# Patient Record
Sex: MALE | Race: WHITE | NOT HISPANIC OR LATINO | URBAN - METROPOLITAN AREA
[De-identification: names, ages, dates, MRNs, and addresses within clinical notes are randomized per-mention and may not be internally consistent; named-entity substitution may affect disease eponyms.]

---

## 2020-02-04 ENCOUNTER — INPATIENT (INPATIENT)
Facility: HOSPITAL | Age: 50
LOS: 10 days | Discharge: HOME HEALTH | End: 2020-02-15
Attending: SURGERY | Admitting: SURGERY
Payer: COMMERCIAL

## 2020-02-04 VITALS
WEIGHT: 184.97 LBS | RESPIRATION RATE: 20 BRPM | OXYGEN SATURATION: 96 % | HEART RATE: 104 BPM | TEMPERATURE: 100 F | DIASTOLIC BLOOD PRESSURE: 73 MMHG | SYSTOLIC BLOOD PRESSURE: 122 MMHG | HEIGHT: 68 IN

## 2020-02-04 DIAGNOSIS — K57.80 DIVERTICULITIS OF INTESTINE, PART UNSPECIFIED, WITH PERFORATION AND ABSCESS WITHOUT BLEEDING: ICD-10-CM

## 2020-02-04 LAB
ALBUMIN SERPL ELPH-MCNC: 4.6 G/DL — SIGNIFICANT CHANGE UP (ref 3.5–5.2)
ALP SERPL-CCNC: 67 U/L — SIGNIFICANT CHANGE UP (ref 30–115)
ALT FLD-CCNC: 20 U/L — SIGNIFICANT CHANGE UP (ref 0–41)
ANION GAP SERPL CALC-SCNC: 16 MMOL/L — HIGH (ref 7–14)
ANION GAP SERPL CALC-SCNC: 17 MMOL/L — HIGH (ref 7–14)
APPEARANCE UR: CLEAR — SIGNIFICANT CHANGE UP
APTT BLD: 29.9 SEC — SIGNIFICANT CHANGE UP (ref 27–39.2)
AST SERPL-CCNC: 26 U/L — SIGNIFICANT CHANGE UP (ref 0–41)
BASOPHILS # BLD AUTO: 0.02 K/UL — SIGNIFICANT CHANGE UP (ref 0–0.2)
BASOPHILS NFR BLD AUTO: 0.2 % — SIGNIFICANT CHANGE UP (ref 0–1)
BILIRUB SERPL-MCNC: 0.3 MG/DL — SIGNIFICANT CHANGE UP (ref 0.2–1.2)
BILIRUB UR-MCNC: NEGATIVE — SIGNIFICANT CHANGE UP
BUN SERPL-MCNC: 11 MG/DL — SIGNIFICANT CHANGE UP (ref 10–20)
BUN SERPL-MCNC: 9 MG/DL — LOW (ref 10–20)
CALCIUM SERPL-MCNC: 9.1 MG/DL — SIGNIFICANT CHANGE UP (ref 8.5–10.1)
CALCIUM SERPL-MCNC: 9.2 MG/DL — SIGNIFICANT CHANGE UP (ref 8.5–10.1)
CHLORIDE SERPL-SCNC: 96 MMOL/L — LOW (ref 98–110)
CHLORIDE SERPL-SCNC: 96 MMOL/L — LOW (ref 98–110)
CO2 SERPL-SCNC: 21 MMOL/L — SIGNIFICANT CHANGE UP (ref 17–32)
CO2 SERPL-SCNC: 22 MMOL/L — SIGNIFICANT CHANGE UP (ref 17–32)
COLOR SPEC: YELLOW — SIGNIFICANT CHANGE UP
CREAT SERPL-MCNC: 0.9 MG/DL — SIGNIFICANT CHANGE UP (ref 0.7–1.5)
CREAT SERPL-MCNC: 0.9 MG/DL — SIGNIFICANT CHANGE UP (ref 0.7–1.5)
DIFF PNL FLD: NEGATIVE — SIGNIFICANT CHANGE UP
EOSINOPHIL # BLD AUTO: 0 K/UL — SIGNIFICANT CHANGE UP (ref 0–0.7)
EOSINOPHIL NFR BLD AUTO: 0 % — SIGNIFICANT CHANGE UP (ref 0–8)
GLUCOSE SERPL-MCNC: 133 MG/DL — HIGH (ref 70–99)
GLUCOSE SERPL-MCNC: 99 MG/DL — SIGNIFICANT CHANGE UP (ref 70–99)
GLUCOSE UR QL: NEGATIVE MG/DL — SIGNIFICANT CHANGE UP
HCT VFR BLD CALC: 41.2 % — LOW (ref 42–52)
HCT VFR BLD CALC: 43.7 % — SIGNIFICANT CHANGE UP (ref 42–52)
HGB BLD-MCNC: 12.9 G/DL — LOW (ref 14–18)
HGB BLD-MCNC: 13.9 G/DL — LOW (ref 14–18)
IMM GRANULOCYTES NFR BLD AUTO: 0.6 % — HIGH (ref 0.1–0.3)
INR BLD: 1.29 RATIO — SIGNIFICANT CHANGE UP (ref 0.65–1.3)
KETONES UR-MCNC: NEGATIVE — SIGNIFICANT CHANGE UP
LACTATE SERPL-SCNC: 1.5 MMOL/L — SIGNIFICANT CHANGE UP (ref 0.7–2)
LEUKOCYTE ESTERASE UR-ACNC: NEGATIVE — SIGNIFICANT CHANGE UP
LYMPHOCYTES # BLD AUTO: 1.21 K/UL — SIGNIFICANT CHANGE UP (ref 1.2–3.4)
LYMPHOCYTES # BLD AUTO: 11.7 % — LOW (ref 20.5–51.1)
MAGNESIUM SERPL-MCNC: 1.9 MG/DL — SIGNIFICANT CHANGE UP (ref 1.8–2.4)
MANUAL SMEAR VERIFICATION: SIGNIFICANT CHANGE UP
MCHC RBC-ENTMCNC: 23.5 PG — LOW (ref 27–31)
MCHC RBC-ENTMCNC: 23.6 PG — LOW (ref 27–31)
MCHC RBC-ENTMCNC: 31.3 G/DL — LOW (ref 32–37)
MCHC RBC-ENTMCNC: 31.8 G/DL — LOW (ref 32–37)
MCV RBC AUTO: 74.3 FL — LOW (ref 80–94)
MCV RBC AUTO: 75.2 FL — LOW (ref 80–94)
MICROCYTES BLD QL: SLIGHT — SIGNIFICANT CHANGE UP
MONOCYTES # BLD AUTO: 0.64 K/UL — HIGH (ref 0.1–0.6)
MONOCYTES NFR BLD AUTO: 6.2 % — SIGNIFICANT CHANGE UP (ref 1.7–9.3)
NEUTROPHILS # BLD AUTO: 8.39 K/UL — HIGH (ref 1.4–6.5)
NEUTROPHILS NFR BLD AUTO: 81.3 % — HIGH (ref 42.2–75.2)
NITRITE UR-MCNC: NEGATIVE — SIGNIFICANT CHANGE UP
NRBC # BLD: 0 /100 WBCS — SIGNIFICANT CHANGE UP (ref 0–0)
NRBC # BLD: 0 /100 WBCS — SIGNIFICANT CHANGE UP (ref 0–0)
PH UR: 6 — SIGNIFICANT CHANGE UP (ref 5–8)
PLAT MORPH BLD: NORMAL — SIGNIFICANT CHANGE UP
PLATELET # BLD AUTO: 181 K/UL — SIGNIFICANT CHANGE UP (ref 130–400)
PLATELET # BLD AUTO: 185 K/UL — SIGNIFICANT CHANGE UP (ref 130–400)
POTASSIUM SERPL-MCNC: 4.5 MMOL/L — SIGNIFICANT CHANGE UP (ref 3.5–5)
POTASSIUM SERPL-MCNC: 4.8 MMOL/L — SIGNIFICANT CHANGE UP (ref 3.5–5)
POTASSIUM SERPL-SCNC: 4.5 MMOL/L — SIGNIFICANT CHANGE UP (ref 3.5–5)
POTASSIUM SERPL-SCNC: 4.8 MMOL/L — SIGNIFICANT CHANGE UP (ref 3.5–5)
PROT SERPL-MCNC: 7.7 G/DL — SIGNIFICANT CHANGE UP (ref 6–8)
PROT UR-MCNC: NEGATIVE MG/DL — SIGNIFICANT CHANGE UP
PROTHROM AB SERPL-ACNC: 14.8 SEC — HIGH (ref 9.95–12.87)
RBC # BLD: 5.48 M/UL — SIGNIFICANT CHANGE UP (ref 4.7–6.1)
RBC # BLD: 5.88 M/UL — SIGNIFICANT CHANGE UP (ref 4.7–6.1)
RBC # FLD: 14.2 % — SIGNIFICANT CHANGE UP (ref 11.5–14.5)
RBC # FLD: 14.4 % — SIGNIFICANT CHANGE UP (ref 11.5–14.5)
RBC BLD AUTO: SIGNIFICANT CHANGE UP
SODIUM SERPL-SCNC: 133 MMOL/L — LOW (ref 135–146)
SODIUM SERPL-SCNC: 135 MMOL/L — SIGNIFICANT CHANGE UP (ref 135–146)
SP GR SPEC: 1.01 — SIGNIFICANT CHANGE UP (ref 1.01–1.03)
UROBILINOGEN FLD QL: 0.2 MG/DL — SIGNIFICANT CHANGE UP (ref 0.2–0.2)
WBC # BLD: 10.32 K/UL — SIGNIFICANT CHANGE UP (ref 4.8–10.8)
WBC # BLD: 12.09 K/UL — HIGH (ref 4.8–10.8)
WBC # FLD AUTO: 10.32 K/UL — SIGNIFICANT CHANGE UP (ref 4.8–10.8)
WBC # FLD AUTO: 12.09 K/UL — HIGH (ref 4.8–10.8)

## 2020-02-04 PROCEDURE — 71045 X-RAY EXAM CHEST 1 VIEW: CPT | Mod: 26

## 2020-02-04 PROCEDURE — 99285 EMERGENCY DEPT VISIT HI MDM: CPT

## 2020-02-04 PROCEDURE — 99222 1ST HOSP IP/OBS MODERATE 55: CPT | Mod: 57

## 2020-02-04 PROCEDURE — 74177 CT ABD & PELVIS W/CONTRAST: CPT | Mod: 26

## 2020-02-04 RX ORDER — HEPARIN SODIUM 5000 [USP'U]/ML
5000 INJECTION INTRAVENOUS; SUBCUTANEOUS EVERY 8 HOURS
Refills: 0 | Status: DISCONTINUED | OUTPATIENT
Start: 2020-02-04 | End: 2020-02-05

## 2020-02-04 RX ORDER — ACETAMINOPHEN 500 MG
650 TABLET ORAL ONCE
Refills: 0 | Status: COMPLETED | OUTPATIENT
Start: 2020-02-04 | End: 2020-02-04

## 2020-02-04 RX ORDER — TEMAZEPAM 15 MG/1
15 CAPSULE ORAL AT BEDTIME
Refills: 0 | Status: DISCONTINUED | OUTPATIENT
Start: 2020-02-04 | End: 2020-02-05

## 2020-02-04 RX ORDER — HYDROMORPHONE HYDROCHLORIDE 2 MG/ML
0.5 INJECTION INTRAMUSCULAR; INTRAVENOUS; SUBCUTANEOUS
Refills: 0 | Status: DISCONTINUED | OUTPATIENT
Start: 2020-02-04 | End: 2020-02-05

## 2020-02-04 RX ORDER — OXYCODONE HYDROCHLORIDE 5 MG/1
5 TABLET ORAL EVERY 6 HOURS
Refills: 0 | Status: DISCONTINUED | OUTPATIENT
Start: 2020-02-04 | End: 2020-02-04

## 2020-02-04 RX ORDER — METRONIDAZOLE 500 MG
500 TABLET ORAL EVERY 8 HOURS
Refills: 0 | Status: DISCONTINUED | OUTPATIENT
Start: 2020-02-04 | End: 2020-02-05

## 2020-02-04 RX ORDER — CIPROFLOXACIN LACTATE 400MG/40ML
400 VIAL (ML) INTRAVENOUS EVERY 12 HOURS
Refills: 0 | Status: DISCONTINUED | OUTPATIENT
Start: 2020-02-04 | End: 2020-02-05

## 2020-02-04 RX ORDER — CIPROFLOXACIN LACTATE 400MG/40ML
400 VIAL (ML) INTRAVENOUS ONCE
Refills: 0 | Status: COMPLETED | OUTPATIENT
Start: 2020-02-04 | End: 2020-02-04

## 2020-02-04 RX ORDER — SODIUM CHLORIDE 9 MG/ML
1000 INJECTION, SOLUTION INTRAVENOUS
Refills: 0 | Status: DISCONTINUED | OUTPATIENT
Start: 2020-02-04 | End: 2020-02-04

## 2020-02-04 RX ORDER — ACETAMINOPHEN 500 MG
650 TABLET ORAL EVERY 6 HOURS
Refills: 0 | Status: DISCONTINUED | OUTPATIENT
Start: 2020-02-04 | End: 2020-02-04

## 2020-02-04 RX ORDER — MORPHINE SULFATE 50 MG/1
4 CAPSULE, EXTENDED RELEASE ORAL
Refills: 0 | Status: DISCONTINUED | OUTPATIENT
Start: 2020-02-04 | End: 2020-02-04

## 2020-02-04 RX ORDER — ACETAMINOPHEN 500 MG
975 TABLET ORAL ONCE
Refills: 0 | Status: COMPLETED | OUTPATIENT
Start: 2020-02-04 | End: 2020-02-04

## 2020-02-04 RX ORDER — SODIUM CHLORIDE 9 MG/ML
1000 INJECTION, SOLUTION INTRAVENOUS
Refills: 0 | Status: DISCONTINUED | OUTPATIENT
Start: 2020-02-04 | End: 2020-02-05

## 2020-02-04 RX ORDER — ACETAMINOPHEN 500 MG
650 TABLET ORAL EVERY 6 HOURS
Refills: 0 | Status: DISCONTINUED | OUTPATIENT
Start: 2020-02-04 | End: 2020-02-05

## 2020-02-04 RX ORDER — IBUPROFEN 200 MG
600 TABLET ORAL EVERY 6 HOURS
Refills: 0 | Status: DISCONTINUED | OUTPATIENT
Start: 2020-02-04 | End: 2020-02-04

## 2020-02-04 RX ORDER — SODIUM CHLORIDE 9 MG/ML
2600 INJECTION, SOLUTION INTRAVENOUS ONCE
Refills: 0 | Status: COMPLETED | OUTPATIENT
Start: 2020-02-04 | End: 2020-02-04

## 2020-02-04 RX ORDER — METRONIDAZOLE 500 MG
500 TABLET ORAL ONCE
Refills: 0 | Status: COMPLETED | OUTPATIENT
Start: 2020-02-04 | End: 2020-02-04

## 2020-02-04 RX ORDER — PANTOPRAZOLE SODIUM 20 MG/1
40 TABLET, DELAYED RELEASE ORAL DAILY
Refills: 0 | Status: DISCONTINUED | OUTPATIENT
Start: 2020-02-04 | End: 2020-02-05

## 2020-02-04 RX ORDER — ONDANSETRON 8 MG/1
4 TABLET, FILM COATED ORAL EVERY 8 HOURS
Refills: 0 | Status: DISCONTINUED | OUTPATIENT
Start: 2020-02-04 | End: 2020-02-05

## 2020-02-04 RX ORDER — HYDROMORPHONE HYDROCHLORIDE 2 MG/ML
1 INJECTION INTRAMUSCULAR; INTRAVENOUS; SUBCUTANEOUS ONCE
Refills: 0 | Status: DISCONTINUED | OUTPATIENT
Start: 2020-02-04 | End: 2020-02-04

## 2020-02-04 RX ADMIN — MORPHINE SULFATE 4 MILLIGRAM(S): 50 CAPSULE, EXTENDED RELEASE ORAL at 11:18

## 2020-02-04 RX ADMIN — HYDROMORPHONE HYDROCHLORIDE 0.5 MILLIGRAM(S): 2 INJECTION INTRAMUSCULAR; INTRAVENOUS; SUBCUTANEOUS at 23:23

## 2020-02-04 RX ADMIN — Medication 100 MILLIGRAM(S): at 14:50

## 2020-02-04 RX ADMIN — HYDROMORPHONE HYDROCHLORIDE 0.5 MILLIGRAM(S): 2 INJECTION INTRAMUSCULAR; INTRAVENOUS; SUBCUTANEOUS at 19:56

## 2020-02-04 RX ADMIN — SODIUM CHLORIDE 2600 MILLILITER(S): 9 INJECTION, SOLUTION INTRAVENOUS at 06:59

## 2020-02-04 RX ADMIN — Medication 650 MILLIGRAM(S): at 16:00

## 2020-02-04 RX ADMIN — HYDROMORPHONE HYDROCHLORIDE 0.5 MILLIGRAM(S): 2 INJECTION INTRAMUSCULAR; INTRAVENOUS; SUBCUTANEOUS at 16:54

## 2020-02-04 RX ADMIN — HYDROMORPHONE HYDROCHLORIDE 0.5 MILLIGRAM(S): 2 INJECTION INTRAMUSCULAR; INTRAVENOUS; SUBCUTANEOUS at 17:10

## 2020-02-04 RX ADMIN — ONDANSETRON 4 MILLIGRAM(S): 8 TABLET, FILM COATED ORAL at 19:50

## 2020-02-04 RX ADMIN — OXYCODONE HYDROCHLORIDE 5 MILLIGRAM(S): 5 TABLET ORAL at 13:38

## 2020-02-04 RX ADMIN — SODIUM CHLORIDE 100 MILLILITER(S): 9 INJECTION, SOLUTION INTRAVENOUS at 12:36

## 2020-02-04 RX ADMIN — OXYCODONE HYDROCHLORIDE 5 MILLIGRAM(S): 5 TABLET ORAL at 14:15

## 2020-02-04 RX ADMIN — Medication 975 MILLIGRAM(S): at 06:59

## 2020-02-04 RX ADMIN — Medication 200 MILLIGRAM(S): at 18:21

## 2020-02-04 RX ADMIN — MORPHINE SULFATE 4 MILLIGRAM(S): 50 CAPSULE, EXTENDED RELEASE ORAL at 11:02

## 2020-02-04 RX ADMIN — Medication 100 MILLIGRAM(S): at 06:59

## 2020-02-04 RX ADMIN — HEPARIN SODIUM 5000 UNIT(S): 5000 INJECTION INTRAVENOUS; SUBCUTANEOUS at 14:52

## 2020-02-04 RX ADMIN — Medication 650 MILLIGRAM(S): at 15:17

## 2020-02-04 RX ADMIN — Medication 100 MILLIGRAM(S): at 22:18

## 2020-02-04 RX ADMIN — Medication 200 MILLIGRAM(S): at 07:47

## 2020-02-04 NOTE — H&P ADULT - NSHPPHYSICALEXAM_GEN_ALL_CORE
Gen NAD, A&Ox3  CV +S1 and S2, RR  Resp +air entry B/L  Abd decreased BS, soft, +mild distention, +generalized tenderness (>LLQ/Suprapubic), +voluntary guarding, no rigidity  Ext no edema, no CT        Vital Signs Last 24 Hrs  T(C): 39.7 (04 Feb 2020 06:34), Max: 39.7 (04 Feb 2020 06:34)  T(F): 103.5 (04 Feb 2020 06:34), Max: 103.5 (04 Feb 2020 06:34)  HR: 106 (04 Feb 2020 06:55) (104 - 106)  BP: 128/78 (04 Feb 2020 06:55) (122/73 - 128/78)  BP(mean): --  RR: 20 (04 Feb 2020 06:55) (20 - 20)  SpO2: 97% (04 Feb 2020 06:55) (96% - 97%)

## 2020-02-04 NOTE — H&P ADULT - HISTORY OF PRESENT ILLNESS
Pt is a 50 y/o male who presented to ER with generalized abdominal pain, >lower abdomen, since last night.   Pt reports feeling fatigued and low grade temp 100.3, 2 days ago. He denies abdominal pain, N/V/D at that time. He was seen at  and discharged home on a Z pack and Motrin PRN, for suspected bronchitis. Pt reported anorexia yesterday and developed acute onset of abdominal pain after dinner, which progressed in severity overnight, which prompted visit to ER. Pt w temp 103.5 in ED, HR-106, BP stable.   Pt found to have acute diverticulitis w microperforation, some free air in upper abd/pelvis and questionable 2.5cm abscess between bladder and sigmoid colon.    Pt given IVF and Cipro/Flagyl in ER.

## 2020-02-04 NOTE — ED PROVIDER NOTE - CLINICAL SUMMARY MEDICAL DECISION MAKING FREE TEXT BOX
49 male here for fever and abdominal pain. Had screening labs imaging medications and reevaluation, plan is for inpatient admission for continued management. Got broad spectrum ABX, IVF, supportive care.

## 2020-02-04 NOTE — ED PROVIDER NOTE - OBJECTIVE STATEMENT
49 year old male comes to emergency room for abdominal and fever. as per patient he wasn't feeling well over the weekend was seen in out patient urgent care center and diagnosis with bronchitis and started on zpak. patient states that tonight he has been nausea with increasing abd pain to the point where patient states its hard to walk due to pain. patient admits to dysuria. patient denies vomiting and diarrhea. but states has had fever and chills all night.

## 2020-02-04 NOTE — ED PROVIDER NOTE - PHYSICAL EXAMINATION
Physical Exam    Vital Signs: I have reviewed the initial vital signs.  Constitutional: well-nourished, appears stated age, no acute distress  Eyes: Conjunctiva pink, Sclera clear, PERRLA, EOMI.  Cardiovascular: S1 and S2, tachy regular rate, regular rhythm, well-perfused extremities, radial pulses equal and 2+  Respiratory: unlabored respiratory effort, clear to auscultation bilaterally no wheezing, rales and rhonchi  Gastrointestinal: soft, +RLQ and LLQ tenderness and guarding present, no pulsatile mass, normal bowl sounds.  : Testicles non-tender  Musculoskeletal: supple neck, no lower extremity edema, no midline tenderness  Integumentary: warm, dry, no rash  Neurologic: awake, alert, cranial nerves II-XII grossly intact, extremities’ motor and sensory functions grossly intact  Psychiatric: appropriate mood, appropriate affect

## 2020-02-04 NOTE — H&P ADULT - ATTENDING COMMENTS
Patient seen and examined in the ED with the surgical PA, on 9:30 hours. History and physical as noted. This is his second episode of diverticulitis but he does not recall whether or not he ever had a colonoscopy. Reviewed the blood work and CT scan with official report.  The patient is currently alert and stable, but with a 103 temp on admission. He feels slightly better since admission, his headache has resolved, he received his IV antibiotics 2 to 3 hours ago.  The patient is ill-appearing and complains of constant abdominal pain. No nausea or vomiting currently. He is voiding clear urine without difficulty. Lung fields are clear, heart sounds are regular in rate and rhythm. The abdomen is somewhat distended and quiet. He has significant tenderness along the lateral abdomen with rebound and some voluntary guarding but the remainder of the abdomen is benign and there is no CVA tenderness bilaterally. No masses, hernias, organomegaly.    Impression: Acute sigmoid diverticulitis with perforation, no clear-cut abscess cavity, free intraperitoneal fluid, or obstruction are noted. There is an associated ileus. The patient is to be admitted to the surgical service for hydration, IV antibiotics, and close observation.  He understands that if he does not improve he may require an exploratory laparotomy and colon resection with possible colostomy. The details of the above were discussed in full and all his questions were answered. He understands and agrees.

## 2020-02-04 NOTE — H&P ADULT - NSHPLABSRESULTS_GEN_ALL_CORE
13.9   10.32 )-----------( 181      ( 04 Feb 2020 06:46 )             43.7       02-04    133<L>  |  96<L>  |  11  ----------------------------<  133<H>  4.8   |  21  |  0.9    Ca    9.2      04 Feb 2020 06:46    TPro  7.7  /  Alb  4.6  /  TBili  0.3  /  DBili  x   /  AST  26  /  ALT  20  /  AlkPhos  67  02-04          PT/INR - ( 04 Feb 2020 06:46 )   PT: 14.80 sec;   INR: 1.29 ratio         PTT - ( 04 Feb 2020 06:46 )  PTT:29.9 sec        < from: CT Abdomen and Pelvis w/ IV Cont (02.04.20 @ 07:04) >    There is a moderate amount of free air within the abdomen pelvis. There is focal significant sigmoid wall thickening with diverticula and significant pericolonic inflammatory changes and foci of free air consistent with acute perforated sigmoid diverticulitis. There are mildly prominent adjacent lymph nodes, likely reactive in nature. There is small bowel dilatation, likely representing an ileus related to the diverticulitis. Findings are less likely related to small bowel obstruction, but follow-up is recommended. The appendix is distended, nonspecific. There is a questionable collection between the bladder and sigmoid colon measuring 2.5 x 1.0 cm on image 79 of series 2      < end of copied text >      Lactate Trend  02-04 @ 06:46 Lactate:1.5

## 2020-02-04 NOTE — ED PROVIDER NOTE - PROGRESS NOTE DETAILS
Pt resting comfortable, examined by me Will give more pain medication, surgery spoken to, CT concerning for perforated Diverticulum

## 2020-02-04 NOTE — H&P ADULT - ASSESSMENT
Pt is a 50 y/o male with Perforated Acute Diverticulitis    NPO/IVF  IV abx-Cipro/Flagyl  Serial Abdominal Exams  CBC/BMP at 1500 and in am  Pain Management  Tylenol PRN temp  DVT/GI Prophylaxis

## 2020-02-04 NOTE — ED PROVIDER NOTE - NS ED ROS FT
Constitutional: (+) fever  Eyes/ENT: (-) blurry vision, (-) epistaxis  Cardiovascular: (-) chest pain, (-) syncope  Respiratory: (+) cough, (-) shortness of breath  Gastrointestinal: (-) vomiting, (-) diarrhea  Musculoskeletal: (-) neck pain, (+) back pain, (-) joint pain  Integumentary: (-) rash, (-) edema  Neurological: (-) headache, (-) altered mental status  Psychiatric: (-) hallucinations  Allergic/Immunologic: (-) pruritus

## 2020-02-04 NOTE — ED PROVIDER NOTE - ATTENDING CONTRIBUTION TO CARE
I personally evaluated the patient. I reviewed the Resident’s or Physician Assistant’s note (as assigned above), and agree with the findings and plan except as documented in my note.  49yM no pmhx  pw  2 days of URI,  last night with  generalized  abdominal pain constant, aw dysuria.  fever today  102 , no nv, nonradiating no back pain, no syncope no abdominal surgeries.  Alert painful distress perrl eomi, no pallor  no jaundice  CVS RRR, Resp CTA no tachypnea no hyperpnea, Abd with  guarding  nondistended , No cva tenderness,      given  exam  pt  sent immediately to CT   (  xray at bedside with IV  begin  placed-  xray done  no air under diaphragm )

## 2020-02-05 ENCOUNTER — RESULT REVIEW (OUTPATIENT)
Age: 50
End: 2020-02-05

## 2020-02-05 LAB
ABO RH CONFIRMATION: SIGNIFICANT CHANGE UP
ANION GAP SERPL CALC-SCNC: 12 MMOL/L — SIGNIFICANT CHANGE UP (ref 7–14)
ANION GAP SERPL CALC-SCNC: 19 MMOL/L — HIGH (ref 7–14)
BASOPHILS # BLD AUTO: 0.02 K/UL — SIGNIFICANT CHANGE UP (ref 0–0.2)
BASOPHILS NFR BLD AUTO: 0.1 % — SIGNIFICANT CHANGE UP (ref 0–1)
BLD GP AB SCN SERPL QL: SIGNIFICANT CHANGE UP
BUN SERPL-MCNC: 13 MG/DL — SIGNIFICANT CHANGE UP (ref 10–20)
BUN SERPL-MCNC: 14 MG/DL — SIGNIFICANT CHANGE UP (ref 10–20)
CALCIUM SERPL-MCNC: 7.8 MG/DL — LOW (ref 8.5–10.1)
CALCIUM SERPL-MCNC: 9 MG/DL — SIGNIFICANT CHANGE UP (ref 8.5–10.1)
CHLORIDE SERPL-SCNC: 95 MMOL/L — LOW (ref 98–110)
CHLORIDE SERPL-SCNC: 99 MMOL/L — SIGNIFICANT CHANGE UP (ref 98–110)
CO2 SERPL-SCNC: 22 MMOL/L — SIGNIFICANT CHANGE UP (ref 17–32)
CO2 SERPL-SCNC: 23 MMOL/L — SIGNIFICANT CHANGE UP (ref 17–32)
CREAT SERPL-MCNC: 0.7 MG/DL — SIGNIFICANT CHANGE UP (ref 0.7–1.5)
CREAT SERPL-MCNC: 0.8 MG/DL — SIGNIFICANT CHANGE UP (ref 0.7–1.5)
EOSINOPHIL # BLD AUTO: 0 K/UL — SIGNIFICANT CHANGE UP (ref 0–0.7)
EOSINOPHIL NFR BLD AUTO: 0 % — SIGNIFICANT CHANGE UP (ref 0–8)
GLUCOSE BLDC GLUCOMTR-MCNC: 111 MG/DL — HIGH (ref 70–99)
GLUCOSE SERPL-MCNC: 112 MG/DL — HIGH (ref 70–99)
GLUCOSE SERPL-MCNC: 163 MG/DL — HIGH (ref 70–99)
HCT VFR BLD CALC: 39.9 % — LOW (ref 42–52)
HCT VFR BLD CALC: 41.1 % — LOW (ref 42–52)
HGB BLD-MCNC: 12.8 G/DL — LOW (ref 14–18)
HGB BLD-MCNC: 13.1 G/DL — LOW (ref 14–18)
IMM GRANULOCYTES NFR BLD AUTO: 0.4 % — HIGH (ref 0.1–0.3)
LACTATE SERPL-SCNC: 1.3 MMOL/L — SIGNIFICANT CHANGE UP (ref 0.7–2)
LYMPHOCYTES # BLD AUTO: 1 K/UL — LOW (ref 1.2–3.4)
LYMPHOCYTES # BLD AUTO: 7.1 % — LOW (ref 20.5–51.1)
MAGNESIUM SERPL-MCNC: 1.5 MG/DL — LOW (ref 1.8–2.4)
MCHC RBC-ENTMCNC: 23.7 PG — LOW (ref 27–31)
MCHC RBC-ENTMCNC: 23.7 PG — LOW (ref 27–31)
MCHC RBC-ENTMCNC: 31.9 G/DL — LOW (ref 32–37)
MCHC RBC-ENTMCNC: 32.1 G/DL — SIGNIFICANT CHANGE UP (ref 32–37)
MCV RBC AUTO: 73.9 FL — LOW (ref 80–94)
MCV RBC AUTO: 74.3 FL — LOW (ref 80–94)
MONOCYTES # BLD AUTO: 1 K/UL — HIGH (ref 0.1–0.6)
MONOCYTES NFR BLD AUTO: 7.1 % — SIGNIFICANT CHANGE UP (ref 1.7–9.3)
NEUTROPHILS # BLD AUTO: 12.09 K/UL — HIGH (ref 1.4–6.5)
NEUTROPHILS NFR BLD AUTO: 85.3 % — HIGH (ref 42.2–75.2)
NRBC # BLD: 0 /100 WBCS — SIGNIFICANT CHANGE UP (ref 0–0)
NRBC # BLD: 0 /100 WBCS — SIGNIFICANT CHANGE UP (ref 0–0)
PHOSPHATE SERPL-MCNC: 2.9 MG/DL — SIGNIFICANT CHANGE UP (ref 2.1–4.9)
PLATELET # BLD AUTO: 179 K/UL — SIGNIFICANT CHANGE UP (ref 130–400)
PLATELET # BLD AUTO: 213 K/UL — SIGNIFICANT CHANGE UP (ref 130–400)
POTASSIUM SERPL-MCNC: 4.4 MMOL/L — SIGNIFICANT CHANGE UP (ref 3.5–5)
POTASSIUM SERPL-MCNC: 4.4 MMOL/L — SIGNIFICANT CHANGE UP (ref 3.5–5)
POTASSIUM SERPL-SCNC: 4.4 MMOL/L — SIGNIFICANT CHANGE UP (ref 3.5–5)
POTASSIUM SERPL-SCNC: 4.4 MMOL/L — SIGNIFICANT CHANGE UP (ref 3.5–5)
RBC # BLD: 5.4 M/UL — SIGNIFICANT CHANGE UP (ref 4.7–6.1)
RBC # BLD: 5.53 M/UL — SIGNIFICANT CHANGE UP (ref 4.7–6.1)
RBC # FLD: 14.5 % — SIGNIFICANT CHANGE UP (ref 11.5–14.5)
RBC # FLD: 14.5 % — SIGNIFICANT CHANGE UP (ref 11.5–14.5)
SODIUM SERPL-SCNC: 134 MMOL/L — LOW (ref 135–146)
SODIUM SERPL-SCNC: 136 MMOL/L — SIGNIFICANT CHANGE UP (ref 135–146)
WBC # BLD: 11.69 K/UL — HIGH (ref 4.8–10.8)
WBC # BLD: 14.17 K/UL — HIGH (ref 4.8–10.8)
WBC # FLD AUTO: 11.69 K/UL — HIGH (ref 4.8–10.8)
WBC # FLD AUTO: 14.17 K/UL — HIGH (ref 4.8–10.8)

## 2020-02-05 PROCEDURE — 44602 SUTURE SMALL INTESTINE: CPT | Mod: 59

## 2020-02-05 PROCEDURE — 44143 PARTIAL REMOVAL OF COLON: CPT

## 2020-02-05 PROCEDURE — 88307 TISSUE EXAM BY PATHOLOGIST: CPT | Mod: 26

## 2020-02-05 PROCEDURE — 74018 RADEX ABDOMEN 1 VIEW: CPT | Mod: 26

## 2020-02-05 PROCEDURE — 99233 SBSQ HOSP IP/OBS HIGH 50: CPT | Mod: 57

## 2020-02-05 PROCEDURE — 71045 X-RAY EXAM CHEST 1 VIEW: CPT | Mod: 26

## 2020-02-05 RX ORDER — OXYCODONE HYDROCHLORIDE 5 MG/1
5 TABLET ORAL EVERY 6 HOURS
Refills: 0 | Status: DISCONTINUED | OUTPATIENT
Start: 2020-02-05 | End: 2020-02-05

## 2020-02-05 RX ORDER — HYDROMORPHONE HYDROCHLORIDE 2 MG/ML
0.5 INJECTION INTRAMUSCULAR; INTRAVENOUS; SUBCUTANEOUS
Refills: 0 | Status: DISCONTINUED | OUTPATIENT
Start: 2020-02-05 | End: 2020-02-06

## 2020-02-05 RX ORDER — OXYCODONE HYDROCHLORIDE 5 MG/1
5 TABLET ORAL EVERY 6 HOURS
Refills: 0 | Status: DISCONTINUED | OUTPATIENT
Start: 2020-02-05 | End: 2020-02-06

## 2020-02-05 RX ORDER — HEPARIN SODIUM 5000 [USP'U]/ML
5000 INJECTION INTRAVENOUS; SUBCUTANEOUS EVERY 8 HOURS
Refills: 0 | Status: DISCONTINUED | OUTPATIENT
Start: 2020-02-05 | End: 2020-02-07

## 2020-02-05 RX ORDER — ACETAMINOPHEN 500 MG
650 TABLET ORAL EVERY 6 HOURS
Refills: 0 | Status: DISCONTINUED | OUTPATIENT
Start: 2020-02-05 | End: 2020-02-15

## 2020-02-05 RX ORDER — SODIUM CHLORIDE 9 MG/ML
1000 INJECTION, SOLUTION INTRAVENOUS
Refills: 0 | Status: DISCONTINUED | OUTPATIENT
Start: 2020-02-05 | End: 2020-02-05

## 2020-02-05 RX ORDER — SODIUM CHLORIDE 9 MG/ML
1000 INJECTION, SOLUTION INTRAVENOUS
Refills: 0 | Status: DISCONTINUED | OUTPATIENT
Start: 2020-02-05 | End: 2020-02-06

## 2020-02-05 RX ORDER — OXYCODONE HYDROCHLORIDE 5 MG/1
10 TABLET ORAL EVERY 6 HOURS
Refills: 0 | Status: DISCONTINUED | OUTPATIENT
Start: 2020-02-05 | End: 2020-02-05

## 2020-02-05 RX ORDER — PANTOPRAZOLE SODIUM 20 MG/1
40 TABLET, DELAYED RELEASE ORAL DAILY
Refills: 0 | Status: DISCONTINUED | OUTPATIENT
Start: 2020-02-05 | End: 2020-02-15

## 2020-02-05 RX ORDER — MORPHINE SULFATE 50 MG/1
4 CAPSULE, EXTENDED RELEASE ORAL
Refills: 0 | Status: DISCONTINUED | OUTPATIENT
Start: 2020-02-05 | End: 2020-02-06

## 2020-02-05 RX ORDER — ONDANSETRON 8 MG/1
4 TABLET, FILM COATED ORAL ONCE
Refills: 0 | Status: COMPLETED | OUTPATIENT
Start: 2020-02-05 | End: 2020-02-05

## 2020-02-05 RX ORDER — IBUPROFEN 200 MG
600 TABLET ORAL EVERY 6 HOURS
Refills: 0 | Status: DISCONTINUED | OUTPATIENT
Start: 2020-02-05 | End: 2020-02-05

## 2020-02-05 RX ORDER — MORPHINE SULFATE 50 MG/1
30 CAPSULE, EXTENDED RELEASE ORAL
Refills: 0 | Status: DISCONTINUED | OUTPATIENT
Start: 2020-02-05 | End: 2020-02-05

## 2020-02-05 RX ORDER — ACETAMINOPHEN 500 MG
650 TABLET ORAL EVERY 6 HOURS
Refills: 0 | Status: DISCONTINUED | OUTPATIENT
Start: 2020-02-05 | End: 2020-02-05

## 2020-02-05 RX ORDER — MORPHINE SULFATE 50 MG/1
30 CAPSULE, EXTENDED RELEASE ORAL
Refills: 0 | Status: DISCONTINUED | OUTPATIENT
Start: 2020-02-05 | End: 2020-02-08

## 2020-02-05 RX ORDER — SODIUM CHLORIDE 9 MG/ML
1000 INJECTION, SOLUTION INTRAVENOUS
Refills: 0 | Status: DISCONTINUED | OUTPATIENT
Start: 2020-02-05 | End: 2020-02-07

## 2020-02-05 RX ORDER — ACETAMINOPHEN 500 MG
650 TABLET ORAL ONCE
Refills: 0 | Status: DISCONTINUED | OUTPATIENT
Start: 2020-02-05 | End: 2020-02-06

## 2020-02-05 RX ORDER — METRONIDAZOLE 500 MG
500 TABLET ORAL EVERY 8 HOURS
Refills: 0 | Status: DISCONTINUED | OUTPATIENT
Start: 2020-02-05 | End: 2020-02-12

## 2020-02-05 RX ORDER — MAGNESIUM SULFATE 500 MG/ML
2 VIAL (ML) INJECTION ONCE
Refills: 0 | Status: COMPLETED | OUTPATIENT
Start: 2020-02-05 | End: 2020-02-06

## 2020-02-05 RX ORDER — HYDROMORPHONE HYDROCHLORIDE 2 MG/ML
0.5 INJECTION INTRAMUSCULAR; INTRAVENOUS; SUBCUTANEOUS ONCE
Refills: 0 | Status: DISCONTINUED | OUTPATIENT
Start: 2020-02-05 | End: 2020-02-05

## 2020-02-05 RX ORDER — IBUPROFEN 200 MG
600 TABLET ORAL EVERY 6 HOURS
Refills: 0 | Status: DISCONTINUED | OUTPATIENT
Start: 2020-02-05 | End: 2020-02-06

## 2020-02-05 RX ORDER — CIPROFLOXACIN LACTATE 400MG/40ML
400 VIAL (ML) INTRAVENOUS EVERY 12 HOURS
Refills: 0 | Status: DISCONTINUED | OUTPATIENT
Start: 2020-02-05 | End: 2020-02-12

## 2020-02-05 RX ADMIN — Medication 100 MILLIGRAM(S): at 14:21

## 2020-02-05 RX ADMIN — OXYCODONE HYDROCHLORIDE 5 MILLIGRAM(S): 5 TABLET ORAL at 14:21

## 2020-02-05 RX ADMIN — HYDROMORPHONE HYDROCHLORIDE 0.5 MILLIGRAM(S): 2 INJECTION INTRAMUSCULAR; INTRAVENOUS; SUBCUTANEOUS at 11:46

## 2020-02-05 RX ADMIN — HYDROMORPHONE HYDROCHLORIDE 0.5 MILLIGRAM(S): 2 INJECTION INTRAMUSCULAR; INTRAVENOUS; SUBCUTANEOUS at 21:45

## 2020-02-05 RX ADMIN — PANTOPRAZOLE SODIUM 40 MILLIGRAM(S): 20 TABLET, DELAYED RELEASE ORAL at 11:26

## 2020-02-05 RX ADMIN — Medication 200 MILLIGRAM(S): at 06:01

## 2020-02-05 RX ADMIN — HYDROMORPHONE HYDROCHLORIDE 0.5 MILLIGRAM(S): 2 INJECTION INTRAMUSCULAR; INTRAVENOUS; SUBCUTANEOUS at 03:18

## 2020-02-05 RX ADMIN — Medication 100 MILLIGRAM(S): at 05:05

## 2020-02-05 RX ADMIN — HYDROMORPHONE HYDROCHLORIDE 0.5 MILLIGRAM(S): 2 INJECTION INTRAMUSCULAR; INTRAVENOUS; SUBCUTANEOUS at 22:10

## 2020-02-05 RX ADMIN — SODIUM CHLORIDE 125 MILLILITER(S): 9 INJECTION, SOLUTION INTRAVENOUS at 11:26

## 2020-02-05 RX ADMIN — SODIUM CHLORIDE 125 MILLILITER(S): 9 INJECTION, SOLUTION INTRAVENOUS at 23:59

## 2020-02-05 RX ADMIN — SODIUM CHLORIDE 100 MILLILITER(S): 9 INJECTION, SOLUTION INTRAVENOUS at 22:07

## 2020-02-05 RX ADMIN — Medication 200 MILLIGRAM(S): at 17:43

## 2020-02-05 RX ADMIN — HYDROMORPHONE HYDROCHLORIDE 0.5 MILLIGRAM(S): 2 INJECTION INTRAMUSCULAR; INTRAVENOUS; SUBCUTANEOUS at 11:26

## 2020-02-05 RX ADMIN — ONDANSETRON 4 MILLIGRAM(S): 8 TABLET, FILM COATED ORAL at 08:33

## 2020-02-05 RX ADMIN — OXYCODONE HYDROCHLORIDE 5 MILLIGRAM(S): 5 TABLET ORAL at 07:58

## 2020-02-05 RX ADMIN — ONDANSETRON 4 MILLIGRAM(S): 8 TABLET, FILM COATED ORAL at 21:55

## 2020-02-05 RX ADMIN — OXYCODONE HYDROCHLORIDE 5 MILLIGRAM(S): 5 TABLET ORAL at 08:40

## 2020-02-05 NOTE — BRIEF OPERATIVE NOTE - NSICDXBRIEFPREOP_GEN_ALL_CORE_FT
PRE-OP DIAGNOSIS:  Perforation of sigmoid colon due to diverticulitis 05-Feb-2020 21:24:00  Darvin Poole

## 2020-02-05 NOTE — BRIEF OPERATIVE NOTE - NSICDXBRIEFPOSTOP_GEN_ALL_CORE_FT
POST-OP DIAGNOSIS:  Perforation of sigmoid colon due to diverticulitis 05-Feb-2020 21:24:24  Darvin Poole

## 2020-02-05 NOTE — BRIEF OPERATIVE NOTE - OPERATION/FINDINGS
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

## 2020-02-05 NOTE — PROGRESS NOTE ADULT - SUBJECTIVE AND OBJECTIVE BOX
Subjective:  Patient seen and examined at bedside.  Still with complaints of abdominal pain, marginally improved from yesterday.  + Nausea overnight without vomiting.  No BM.  Patient reports cough this am along with wheezing.  He reports he developed these symptoms on  and they have persisted.  No production.  No SOB.      Vital Signs Last 24 Hrs  T(C): 37.1 (2020 05:34), Max: 37.9 (2020 17:00)  T(F): 98.8 (2020 05:34), Max: 100.2 (2020 17:00)  HR: 81 (2020 05:34) (78 - 97)  BP: 108/69 (2020 05:34) (103/58 - 117/71)  BP(mean): --  RR: 16 (2020 05:34) (16 - 18)  SpO2: 99% (2020 09:50) (96% - 99%)    General Appearance: Appears well, NAD  Neck: Supple  Chest: Equal expansion bilaterally, equal breath sounds.  CTA B/L  CV: Pulse regular presently  Abdomen: + distension and tympany throughout.  + TTP generalized but worse in LLQ quadrant   Extremities: Grossly symmetric, no calf tend b/l    I&O's Summary: Not recorded    MEDICATIONS  (STANDING):  ciprofloxacin   IVPB 400 milliGRAM(s) IV Intermittent every 12 hours  heparin  Injectable 5000 Unit(s) SubCutaneous every 8 hours  ibuprofen  Tablet. 600 milliGRAM(s) Oral every 6 hours  lactated ringers. 1000 milliLiter(s) (125 mL/Hr) IV Continuous <Continuous>  metroNIDAZOLE  IVPB 500 milliGRAM(s) IV Intermittent every 8 hours  pantoprazole  Injectable 40 milliGRAM(s) IV Push daily    MEDICATIONS  (PRN):  acetaminophen   Tablet .. 650 milliGRAM(s) Oral every 6 hours PRN Temp greater or equal to 38.5C (101.3F)  ondansetron Injectable 4 milliGRAM(s) IV Push every 8 hours PRN Nausea and/or Vomiting  oxyCODONE    IR 5 milliGRAM(s) Oral every 6 hours PRN Moderate Pain (4 - 6)  oxyCODONE    IR 10 milliGRAM(s) Oral every 6 hours PRN Severe Pain (7 - 10)  temazepam 15 milliGRAM(s) Oral at bedtime PRN Insomnia      LABS:                        12.9   12.09 )-----------( 185      ( 2020 15:36 )             41.2     02-04    135  |  96<L>  |  9<L>  ----------------------------<  99  4.5   |  22  |  0.9    Ca    9.1      2020 15:36  Mg     1.9     02-04    TPro  7.7  /  Alb  4.6  /  TBili  0.3  /  DBili  x   /  AST  26  /  ALT  20  /  AlkPhos  67  02-04    PT/INR - ( 2020 06:46 )   PT: 14.80 sec;   INR: 1.29 ratio         PTT - ( 2020 06:46 )  PTT:29.9 sec  Urinalysis Basic - ( 2020 11:01 )    Color: Yellow / Appearance: Clear / S.010 / pH: x  Gluc: x / Ketone: Negative  / Bili: Negative / Urobili: 0.2 mg/dL   Blood: x / Protein: Negative mg/dL / Nitrite: Negative   Leuk Esterase: Negative / RBC: x / WBC x   Sq Epi: x / Non Sq Epi: x / Bacteria: x        RADIOLOGY & ADDITIONAL STUDIES:    CT A/P w/IV  IMPRESSION:    Acute perforated sigmoid diverticulitis with a moderate amount of free air in theupper abdomen and less in the pelvis.    Questionable small abscess between the sigmoid colon and bladder measuring 2.5 x 1 cm.    Small bowel dilatation, likely represents ileus. Findings are less likely related to small bowel obstruction. However follow-up is recommended.    Spoke with MORIS CELAYA MD on 2020 7:24 AM with readback.    CXR    Impression:      No radiographic evidence of acute cardiopulmonary disease.

## 2020-02-05 NOTE — PROGRESS NOTE ADULT - ASSESSMENT
49M w/complaints of abdominal pain found to have perforated Diverticulitis w/abscess:    - maintain NPO  - c/w IV abx-Cipro/Flagyl  - Serial Abdominal Exams  - FU AM Labs  - Pain Management, try to maintain off Parenteral opiates  - Lungs CTA.  CXR WNL on admission.  Guaifenesin 2tsp PO q6h PRN  - DVT/GI Prophylaxis  - will d/w team 49M w/complaints of abdominal pain found to have perforated Diverticulitis w/abscess:    - maintain NPO  - c/w IV abx-Cipro/Flagyl  - change IVF to D5NS at 125/HR  - ID c/s  - Serial Abdominal Exams  - FU AM Labs  - Pain Management, try to maintain off Parenteral opiates  - Lungs CTA.  CXR WNL on admission.  Guaifenesin 2tsp PO q6h PRN  - DVT/GI Prophylaxis  - will d/w team

## 2020-02-06 LAB
ALBUMIN SERPL ELPH-MCNC: 2.8 G/DL — LOW (ref 3.5–5.2)
ALP SERPL-CCNC: 41 U/L — SIGNIFICANT CHANGE UP (ref 30–115)
ALT FLD-CCNC: 15 U/L — SIGNIFICANT CHANGE UP (ref 0–41)
ANION GAP SERPL CALC-SCNC: 11 MMOL/L — SIGNIFICANT CHANGE UP (ref 7–14)
AST SERPL-CCNC: 20 U/L — SIGNIFICANT CHANGE UP (ref 0–41)
BASOPHILS # BLD AUTO: 0.02 K/UL — SIGNIFICANT CHANGE UP (ref 0–0.2)
BASOPHILS NFR BLD AUTO: 0.2 % — SIGNIFICANT CHANGE UP (ref 0–1)
BILIRUB SERPL-MCNC: 0.5 MG/DL — SIGNIFICANT CHANGE UP (ref 0.2–1.2)
BUN SERPL-MCNC: 11 MG/DL — SIGNIFICANT CHANGE UP (ref 10–20)
CALCIUM SERPL-MCNC: 7.4 MG/DL — LOW (ref 8.5–10.1)
CHLORIDE SERPL-SCNC: 100 MMOL/L — SIGNIFICANT CHANGE UP (ref 98–110)
CO2 SERPL-SCNC: 23 MMOL/L — SIGNIFICANT CHANGE UP (ref 17–32)
CREAT SERPL-MCNC: 0.7 MG/DL — SIGNIFICANT CHANGE UP (ref 0.7–1.5)
CULTURE RESULTS: NO GROWTH — SIGNIFICANT CHANGE UP
EOSINOPHIL # BLD AUTO: 0 K/UL — SIGNIFICANT CHANGE UP (ref 0–0.7)
EOSINOPHIL NFR BLD AUTO: 0 % — SIGNIFICANT CHANGE UP (ref 0–8)
GLUCOSE BLDC GLUCOMTR-MCNC: 130 MG/DL — HIGH (ref 70–99)
GLUCOSE SERPL-MCNC: 153 MG/DL — HIGH (ref 70–99)
GRAM STN FLD: SIGNIFICANT CHANGE UP
HCT VFR BLD CALC: 35.4 % — LOW (ref 42–52)
HGB BLD-MCNC: 11.1 G/DL — LOW (ref 14–18)
IMM GRANULOCYTES NFR BLD AUTO: 0.4 % — HIGH (ref 0.1–0.3)
LYMPHOCYTES # BLD AUTO: 1.08 K/UL — LOW (ref 1.2–3.4)
LYMPHOCYTES # BLD AUTO: 10.8 % — LOW (ref 20.5–51.1)
MAGNESIUM SERPL-MCNC: 2.2 MG/DL — SIGNIFICANT CHANGE UP (ref 1.8–2.4)
MCHC RBC-ENTMCNC: 23.5 PG — LOW (ref 27–31)
MCHC RBC-ENTMCNC: 31.4 G/DL — LOW (ref 32–37)
MCV RBC AUTO: 75 FL — LOW (ref 80–94)
MONOCYTES # BLD AUTO: 0.8 K/UL — HIGH (ref 0.1–0.6)
MONOCYTES NFR BLD AUTO: 8 % — SIGNIFICANT CHANGE UP (ref 1.7–9.3)
NEUTROPHILS # BLD AUTO: 8.02 K/UL — HIGH (ref 1.4–6.5)
NEUTROPHILS NFR BLD AUTO: 80.6 % — HIGH (ref 42.2–75.2)
NRBC # BLD: 0 /100 WBCS — SIGNIFICANT CHANGE UP (ref 0–0)
PHOSPHATE SERPL-MCNC: 2.6 MG/DL — SIGNIFICANT CHANGE UP (ref 2.1–4.9)
PLATELET # BLD AUTO: 183 K/UL — SIGNIFICANT CHANGE UP (ref 130–400)
POTASSIUM SERPL-MCNC: 4.6 MMOL/L — SIGNIFICANT CHANGE UP (ref 3.5–5)
POTASSIUM SERPL-SCNC: 4.6 MMOL/L — SIGNIFICANT CHANGE UP (ref 3.5–5)
PROT SERPL-MCNC: 5 G/DL — LOW (ref 6–8)
RBC # BLD: 4.72 M/UL — SIGNIFICANT CHANGE UP (ref 4.7–6.1)
RBC # FLD: 14.5 % — SIGNIFICANT CHANGE UP (ref 11.5–14.5)
SODIUM SERPL-SCNC: 134 MMOL/L — LOW (ref 135–146)
SPECIMEN SOURCE: SIGNIFICANT CHANGE UP
SPECIMEN SOURCE: SIGNIFICANT CHANGE UP
WBC # BLD: 9.96 K/UL — SIGNIFICANT CHANGE UP (ref 4.8–10.8)
WBC # FLD AUTO: 9.96 K/UL — SIGNIFICANT CHANGE UP (ref 4.8–10.8)

## 2020-02-06 RX ORDER — PHENOL/SODIUM PHENOLATE
2 AEROSOL, SPRAY (ML) MUCOUS MEMBRANE EVERY 4 HOURS
Refills: 0 | Status: DISCONTINUED | OUTPATIENT
Start: 2020-02-06 | End: 2020-02-15

## 2020-02-06 RX ORDER — KETOROLAC TROMETHAMINE 30 MG/ML
15 SYRINGE (ML) INJECTION EVERY 6 HOURS
Refills: 0 | Status: DISCONTINUED | OUTPATIENT
Start: 2020-02-06 | End: 2020-02-08

## 2020-02-06 RX ORDER — HYDROMORPHONE HYDROCHLORIDE 2 MG/ML
0.5 INJECTION INTRAMUSCULAR; INTRAVENOUS; SUBCUTANEOUS
Refills: 0 | Status: DISCONTINUED | OUTPATIENT
Start: 2020-02-06 | End: 2020-02-06

## 2020-02-06 RX ADMIN — Medication 2 SPRAY(S): at 12:08

## 2020-02-06 RX ADMIN — Medication 200 MILLIGRAM(S): at 17:42

## 2020-02-06 RX ADMIN — Medication 15 MILLIGRAM(S): at 17:42

## 2020-02-06 RX ADMIN — Medication 15 MILLIGRAM(S): at 12:40

## 2020-02-06 RX ADMIN — HEPARIN SODIUM 5000 UNIT(S): 5000 INJECTION INTRAVENOUS; SUBCUTANEOUS at 21:44

## 2020-02-06 RX ADMIN — Medication 50 GRAM(S): at 01:48

## 2020-02-06 RX ADMIN — Medication 600 MILLIGRAM(S): at 02:03

## 2020-02-06 RX ADMIN — HEPARIN SODIUM 5000 UNIT(S): 5000 INJECTION INTRAVENOUS; SUBCUTANEOUS at 01:49

## 2020-02-06 RX ADMIN — Medication 15 MILLIGRAM(S): at 18:10

## 2020-02-06 RX ADMIN — HEPARIN SODIUM 5000 UNIT(S): 5000 INJECTION INTRAVENOUS; SUBCUTANEOUS at 14:48

## 2020-02-06 RX ADMIN — Medication 100 MILLIGRAM(S): at 21:44

## 2020-02-06 RX ADMIN — Medication 2 SPRAY(S): at 16:55

## 2020-02-06 RX ADMIN — Medication 650 MILLIGRAM(S): at 17:46

## 2020-02-06 RX ADMIN — Medication 100 MILLIGRAM(S): at 06:03

## 2020-02-06 RX ADMIN — Medication 200 MILLIGRAM(S): at 06:03

## 2020-02-06 RX ADMIN — SODIUM CHLORIDE 125 MILLILITER(S): 9 INJECTION, SOLUTION INTRAVENOUS at 16:49

## 2020-02-06 RX ADMIN — Medication 100 MILLIGRAM(S): at 14:42

## 2020-02-06 RX ADMIN — HEPARIN SODIUM 5000 UNIT(S): 5000 INJECTION INTRAVENOUS; SUBCUTANEOUS at 06:04

## 2020-02-06 RX ADMIN — Medication 650 MILLIGRAM(S): at 18:10

## 2020-02-06 RX ADMIN — Medication 650 MILLIGRAM(S): at 06:02

## 2020-02-06 RX ADMIN — Medication 15 MILLIGRAM(S): at 12:06

## 2020-02-06 RX ADMIN — Medication 650 MILLIGRAM(S): at 02:03

## 2020-02-06 RX ADMIN — Medication 600 MILLIGRAM(S): at 06:02

## 2020-02-06 RX ADMIN — PANTOPRAZOLE SODIUM 40 MILLIGRAM(S): 20 TABLET, DELAYED RELEASE ORAL at 12:06

## 2020-02-06 RX ADMIN — HYDROMORPHONE HYDROCHLORIDE 0.5 MILLIGRAM(S): 2 INJECTION INTRAMUSCULAR; INTRAVENOUS; SUBCUTANEOUS at 02:03

## 2020-02-06 NOTE — CHART NOTE - NSCHARTNOTEFT_GEN_A_CORE
48 y/o male with perforated diverticulitis s/p Ex-Lap, Cruzito's procedure. Seen and examined c/o soreness. Dressing; C/D/I, no bleed, MT in place, Barcenas in place, averaging 80 ml/hour urine 50 y/o male with perforated diverticulitis s/p Ex-Lap, creation of end colostomy, Cruzito's procedure. Seen and examined c/o soreness. Dressing; C/D/I, no bleed, MT in place about 20 ml output, NGT in plce with no output, Barcenas in place, averaging 70 ml/hour urine. Vitals as below.    Vital Signs Last 24 Hrs  T(C): 37.2 (06 Feb 2020 06:06), Max: 37.7 (06 Feb 2020 03:07)  T(F): 98.9 (06 Feb 2020 06:06), Max: 99.8 (06 Feb 2020 03:07)  HR: 112 (06 Feb 2020 06:06) (89 - 123)  BP: 113/83 (06 Feb 2020 06:06) (113/83 - 150/93)  BP(mean): --  RR: 16 (06 Feb 2020 06:06) (16 - 18)  SpO2: 98% (05 Feb 2020 23:03) (96% - 99%)    A/P:   Pain mgmt on PCA -morphine but still in pain  -Will give dilaudid 0.5mg iv now and reevaluate   -Monitor urine output  -Cont IVF at 125 ml/hr  -Encourage OOB to Chair/amb in am  -Encourage incentive karis in am  -F/U am labs  -GI and DVT prophylaxis

## 2020-02-06 NOTE — CONSULT NOTE ADULT - PROBLEM SELECTOR RECOMMENDATION 9
acute illness  s/p surgery   await Cx   Pt post op with source control - if No complications - will  need at least 7 days post op abx - IV to PO

## 2020-02-06 NOTE — CONSULT NOTE ADULT - SUBJECTIVE AND OBJECTIVE BOX
ROMAIN SMITH  49y, Male  Allergy: No Known Allergies      CHIEF COMPLAINT: Perforated Diverticulitis (2020 08:11)      HPI:  Pt is a 50 y/o male who presented to ER with generalized abdominal pain, >lower abdomen, since last night.   Pt reports feeling fatigued and low grade temp 100.3, 2 days ago. He denies abdominal pain, N/V/D at that time. He was seen at  and discharged home on a Z pack and Motrin PRN, for suspected bronchitis. Pt reported anorexia yesterday and developed acute onset of abdominal pain after dinner, which progressed in severity overnight, which prompted visit to ER. Pt w temp 103.5 in ED, HR-106, BP stable.   Pt found to have acute diverticulitis w microperforation, some free air in upper abd/pelvis and questionable 2.5cm abscess between bladder and sigmoid colon.    Pt given IVF and Cipro/Flagyl in ER. (2020 09:48)    FAMILY HISTORY:    PAST MEDICAL & SURGICAL HISTORY:  No pertinent past medical history: diverticulitis (early &#x27;s)  No significant past surgical history    FSH - not relevant   Substance Use (  ) never used  (  ) IVDU (  ) Other:  Tobacco Usage:  (   ) never smoked   (   ) former smoker   (   ) current smoker   Alcohol Usage: (   ) social  (   ) daily use (   ) denies  Sexual History:       ROS  10 system review - neg     VITALS:  T(F): 98.9, Max: 99.8 (20 @ 03:07)  HR: 112  BP: 113/83  RR: 16Vital Signs Last 24 Hrs  T(C): 37.2 (2020 06:06), Max: 37.7 (2020 03:07)  T(F): 98.9 (2020 06:06), Max: 99.8 (2020 03:07)  HR: 112 (2020 06:06) (89 - 123)  BP: 113/83 (2020 06:06) (113/83 - 150/93)  BP(mean): --  RR: 16 (2020 06:06) (16 - 18)  SpO2: 98% (2020 23:03) (96% - 99%)    PHYSICAL EXAM:  Gen: NAD, resting in bed  HEENT: Normocephalic, atraumatic  Neck: supple, no lymphadenopathy  CV: s1 s 2+   Lungs: decreased BS   Abdomen: Soft, full. ostomy + MT in situ +   Ext: Warm, well perfused  Neuro: non focal, awake  Skin: no rash, no erythema    TESTS & MEASUREMENTS:                        12.8   14.17 )-----------( 213      ( 2020 22:15 )             39.9     -    134<L>  |  99  |  13  ----------------------------<  163<H>  4.4   |  23  |  0.7    Ca    7.8<L>      2020 22:15  Phos  2.9     -  Mg     1.5           eGFR if Non African American: 111 mL/min/1.73M2 (20 @ 22:15)  eGFR if African American: 128 mL/min/1.73M2 (20 @ 22:15)  eGFR if Non African American: 105 mL/min/1.73M2 (20 @ 10:25)  eGFR if African American: 122 mL/min/1.73M2 (20 @ 10:25)      Urinalysis Basic - ( 2020 11:01 )    Color: Yellow / Appearance: Clear / S.010 / pH: x  Gluc: x / Ketone: Negative  / Bili: Negative / Urobili: 0.2 mg/dL   Blood: x / Protein: Negative mg/dL / Nitrite: Negative   Leuk Esterase: Negative / RBC: x / WBC x   Sq Epi: x / Non Sq Epi: x / Bacteria: x        Culture - Urine (collected 20 @ 17:08)  Source: .Urine Clean Catch (Midstream)  Final Report (20 @ 00:12):    No growth    Culture - Blood (collected 20 @ 09:47)  Source: .Blood Blood-Peripheral  Preliminary Report (20 @ 17:01):    No growth to date.    Culture - Blood (collected 20 @ 09:47)  Source: .Blood Blood-Peripheral  Preliminary Report (20 @ 17:01):    No growth to date.        Lactate, Blood: 1.3 mmol/L (20 @ 10:25)  Lactate, Blood: 1.5 mmol/L (20 @ 06:46)      INFECTIOUS DISEASES TESTING      RADIOLOGY & ADDITIONAL TESTS:  I have personally reviewed the last Chest xray  CXR  Xray Chest 1 View- PORTABLE-Urgent:   EXAM:  XR CHEST PORTABLE URGENT 1V            PROCEDURE DATE:  2020            INTERPRETATION:  Clinical History / Reason for exam: Severe abdominal pain    Comparison : Chest radiograph 2020.    Technique/Positioning: Portable frontal view.    Findings:    Support devices: None.    Cardiac/mediastinum/hilum: Unremarkable.    Lung parenchyma/Pleura: Low lung volumes. No consolidation, effusion or pneumothorax..    Skeleton/soft tissues: Unremarkable.    Impression:    Low lung volumeswithout evidence of acute cardiopulmonary disease.                                        SHEBA NORTON M.D., ATTENDING RADIOLOGIST  This document has been electronically signed. 2020 12:11PM             (20 @ 12:07)      CT  CT Abdomen and Pelvis w/ IV Cont:   EXAM:  CT ABDOMEN AND PELVIS IC            PROCEDURE DATE:  2020            INTERPRETATION:  CLINICAL STATEMENT: Abdominal pain and fever      TECHNIQUE: Contiguous axial CT images were obtained from the lower chest to the pubic symphysis after 95 cc of Optiray 320 intravenous contrast.  Oral contrast was not given.  Reformatted images in the coronal and sagittal planes were acquired.    COMPARISON CT: None.      FINDINGS:    LOWER CHEST: Small amount of subsegmental atelectasis.    HEPATOBILIARY: Unremarkable.    SPLEEN: Unremarkable.    PANCREAS: Unremarkable.    ADRENAL GLANDS: Unremarkable.    KIDNEYS: Subcentimeter hypodensities are too small to characterize. There is no hydronephrosis    ABDOMINOPELVIC NODES: Unremarkable.    PELVIC ORGANS: Unremarkable.    PERITONEUM/MESENTERY/BOWEL: There is a moderate amount of free air within the abdomen pelvis. There is focal significant sigmoid wall thickening with diverticula and significant pericolonic inflammatory changes and foci of free air consistent with acute perforated sigmoid diverticulitis. There are mildly prominent adjacent lymph nodes, likely reactive in nature. There is small bowel dilatation, likely representing an ileus related to the diverticulitis. Findings are less likely related to small bowel obstruction, but follow-up is recommended. The appendix is distended, nonspecific. There is a questionable collection between the bladder and sigmoid colon measuring 2.5 x 1.0 cm on image 79 of series 2    BONES/SOFT TISSUES: Degenerative change.    OTHER: Apparent coronary artery calcifications. Trace pericardial effusion, nonspecific. Circumaortic left renal vein.    IMPRESSION:    Acute perforated sigmoid diverticulitis with a moderate amount of free air in theupper abdomen and less in the pelvis.    Questionable small abscess between the sigmoid colon and bladder measuring 2.5 x 1 cm.    Small bowel dilatation, likely represents ileus. Findings are less likely related to small bowel obstruction. However follow-up is recommended.    Spoke with MORIS CELAYA MD on 2020 7:24 AM with readback.                  ARNULFO DELGADO M.D., ATTENDING RADIOLOGIST  This document has been electronically signed. 2020  7:39AM             (20 @ 07:04)      CARDIOLOGY TESTING  12 Lead ECG:   Ventricular Rate 96 BPM    Atrial Rate 96 BPM    P-R Interval 170 ms    QRS Duration 84 ms    Q-T Interval 352 ms    QTC Calculation(Bezet) 444 ms    P Axis 43 degrees    R Axis 1 degrees    T Axis 25 degrees    Diagnosis Line Normal sinus rhythm  Normal ECG    Confirmed by SARAI HENAO MD ([id number]) on 2020 1:07:24 PM (20 @ 07:14)      MEDICATIONS  acetaminophen   Tablet .. 650  ciprofloxacin   IVPB 400  dextrose 5% + sodium chloride 0.9%. 1000  heparin  Injectable 5000  ibuprofen  Tablet. 600  metroNIDAZOLE  IVPB 500  morphine PCA (5 mG/mL) 30  pantoprazole  Injectable 40      ANTIBIOTICS:  ciprofloxacin   IVPB 400 milliGRAM(s) IV Intermittent every 12 hours  metroNIDAZOLE  IVPB 500 milliGRAM(s) IV Intermittent every 8 hours

## 2020-02-06 NOTE — PROGRESS NOTE ADULT - SUBJECTIVE AND OBJECTIVE BOX
S: Pt feeling OK; slept overnight; pain better controlled with dilaudid versus PCA pump; no N/V; NGT irritating back of his throat; no gas/stool in colostomy yet; perea in place  O; Vital Signs Last 24 Hrs  T(C): 37.2 (06 Feb 2020 06:06), Max: 37.7 (06 Feb 2020 03:07)  T(F): 98.9 (06 Feb 2020 06:06), Max: 99.8 (06 Feb 2020 03:07)  HR: 112 (06 Feb 2020 06:06) (89 - 123)  BP: 113/83 (06 Feb 2020 06:06) (113/83 - 150/93)  BP(mean): --  RR: 16 (06 Feb 2020 06:06) (16 - 18)  SpO2: 98% (05 Feb 2020 23:03) (96% - 99%)    I&O's Detail    05 Feb 2020 07:01  -  06 Feb 2020 07:00  --------------------------------------------------------  IN:    lactated ringers.: 150 mL    lactated ringers.: 1000 mL  Total IN: 1150 mL    OUT:    Bulb: 40 mL    Emesis: 200 mL    Indwelling Catheter - Urethral: 400 mL    Voided: 775 mL  Total OUT: 1415 mL    Total NET: -265 mL      EXAM:  lungs: cta  cvs: tachy; s1s2  abd: soft, mildly distended; dressing with scant, dried serosanguinous discharge; MT with serosanguinous drainage; stoma pink, viable; scant sweat in bag  ext: no LE edema    Labs:  CAPILLARY BLOOD GLUCOSE      POCT Blood Glucose.: 111 mg/dL (05 Feb 2020 19:59)                          11.1   9.96  )-----------( 183      ( 06 Feb 2020 08:22 )             35.4       Auto Neutrophil %: 80.6 % (02-06-20 @ 08:22)  Auto Immature Granulocyte %: 0.4 % (02-06-20 @ 08:22)  Auto Immature Granulocyte %: 0.4 % (02-05-20 @ 22:15)  Auto Neutrophil %: 85.3 % (02-05-20 @ 22:15)    02-06    134<L>  |  100  |  11  ----------------------------<  153<H>  4.6   |  23  |  0.7      Calcium, Total Serum: 7.4 mg/dL (02-06-20 @ 08:22)      LFTs:             5.0  | 0.5  | 20       ------------------[41      ( 06 Feb 2020 08:22 )  2.8  | x    | 15          Lipase:x      Amylase:x         Lactate, Blood: 1.3 mmol/L (02-05-20 @ 10:25)  Lactate, Blood: 1.5 mmol/L (02-04-20 @ 06:46)      Culture - Body Fluid with Gram Stain (collected 05 Feb 2020 19:00)  Source: Peritoneal peritoneal fluid  Gram Stain (06 Feb 2020 08:11):    polymorphonuclear leukocytes seen    No organisms seen    by cytocentrifuge    Culture - Urine (collected 04 Feb 2020 17:08)  Source: .Urine Clean Catch (Midstream)  Final Report (06 Feb 2020 00:12):    No growth    Culture - Blood (collected 04 Feb 2020 09:47)  Source: .Blood Blood-Peripheral  Preliminary Report (05 Feb 2020 17:01):    No growth to date.    Culture - Blood (collected 04 Feb 2020 09:47)  Source: .Blood Blood-Peripheral  Preliminary Report (05 Feb 2020 17:01):    No growth to date.

## 2020-02-06 NOTE — PROGRESS NOTE ADULT - ASSESSMENT
POD #1 s/p ex-lap, hartmanns procedure, colostomy    1. Continue NGT, NPO, perea  2. OOB to chair, ambulate  3.  Continue morphine PCA pump., toradol, tylenol  4. Chloraseptic spray for throat irritation  5. GI/DVT propylaxis  6. Await bowel function prior to removing NGT/clear liquids

## 2020-02-07 LAB
ANION GAP SERPL CALC-SCNC: 10 MMOL/L — SIGNIFICANT CHANGE UP (ref 7–14)
BUN SERPL-MCNC: 7 MG/DL — LOW (ref 10–20)
CALCIUM SERPL-MCNC: 7.4 MG/DL — LOW (ref 8.5–10.1)
CHLORIDE SERPL-SCNC: 103 MMOL/L — SIGNIFICANT CHANGE UP (ref 98–110)
CO2 SERPL-SCNC: 25 MMOL/L — SIGNIFICANT CHANGE UP (ref 17–32)
CREAT SERPL-MCNC: 0.6 MG/DL — LOW (ref 0.7–1.5)
GLUCOSE SERPL-MCNC: 110 MG/DL — HIGH (ref 70–99)
HCT VFR BLD CALC: 29.8 % — LOW (ref 42–52)
HGB BLD-MCNC: 9.3 G/DL — LOW (ref 14–18)
MAGNESIUM SERPL-MCNC: 2.2 MG/DL — SIGNIFICANT CHANGE UP (ref 1.8–2.4)
MCHC RBC-ENTMCNC: 23.5 PG — LOW (ref 27–31)
MCHC RBC-ENTMCNC: 31.2 G/DL — LOW (ref 32–37)
MCV RBC AUTO: 75.4 FL — LOW (ref 80–94)
NRBC # BLD: 0 /100 WBCS — SIGNIFICANT CHANGE UP (ref 0–0)
PHOSPHATE SERPL-MCNC: 1.9 MG/DL — LOW (ref 2.1–4.9)
PLATELET # BLD AUTO: 175 K/UL — SIGNIFICANT CHANGE UP (ref 130–400)
POTASSIUM SERPL-MCNC: 3.9 MMOL/L — SIGNIFICANT CHANGE UP (ref 3.5–5)
POTASSIUM SERPL-SCNC: 3.9 MMOL/L — SIGNIFICANT CHANGE UP (ref 3.5–5)
RBC # BLD: 3.95 M/UL — LOW (ref 4.7–6.1)
RBC # FLD: 14.6 % — HIGH (ref 11.5–14.5)
SODIUM SERPL-SCNC: 138 MMOL/L — SIGNIFICANT CHANGE UP (ref 135–146)
WBC # BLD: 7.37 K/UL — SIGNIFICANT CHANGE UP (ref 4.8–10.8)
WBC # FLD AUTO: 7.37 K/UL — SIGNIFICANT CHANGE UP (ref 4.8–10.8)

## 2020-02-07 RX ORDER — ENOXAPARIN SODIUM 100 MG/ML
40 INJECTION SUBCUTANEOUS DAILY
Refills: 0 | Status: DISCONTINUED | OUTPATIENT
Start: 2020-02-07 | End: 2020-02-15

## 2020-02-07 RX ORDER — SODIUM CHLORIDE 9 MG/ML
1000 INJECTION, SOLUTION INTRAVENOUS
Refills: 0 | Status: DISCONTINUED | OUTPATIENT
Start: 2020-02-07 | End: 2020-02-11

## 2020-02-07 RX ORDER — ZOLPIDEM TARTRATE 10 MG/1
5 TABLET ORAL AT BEDTIME
Refills: 0 | Status: DISCONTINUED | OUTPATIENT
Start: 2020-02-07 | End: 2020-02-07

## 2020-02-07 RX ADMIN — HEPARIN SODIUM 5000 UNIT(S): 5000 INJECTION INTRAVENOUS; SUBCUTANEOUS at 05:22

## 2020-02-07 RX ADMIN — Medication 200 MILLIGRAM(S): at 18:16

## 2020-02-07 RX ADMIN — Medication 15 MILLIGRAM(S): at 18:15

## 2020-02-07 RX ADMIN — Medication 650 MILLIGRAM(S): at 23:14

## 2020-02-07 RX ADMIN — Medication 200 MILLIGRAM(S): at 05:22

## 2020-02-07 RX ADMIN — Medication 15 MILLIGRAM(S): at 11:53

## 2020-02-07 RX ADMIN — Medication 15 MILLIGRAM(S): at 05:22

## 2020-02-07 RX ADMIN — Medication 15 MILLIGRAM(S): at 23:30

## 2020-02-07 RX ADMIN — Medication 15 MILLIGRAM(S): at 01:23

## 2020-02-07 RX ADMIN — PANTOPRAZOLE SODIUM 40 MILLIGRAM(S): 20 TABLET, DELAYED RELEASE ORAL at 11:52

## 2020-02-07 RX ADMIN — Medication 15 MILLIGRAM(S): at 23:14

## 2020-02-07 RX ADMIN — Medication 650 MILLIGRAM(S): at 01:23

## 2020-02-07 RX ADMIN — Medication 650 MILLIGRAM(S): at 11:53

## 2020-02-07 RX ADMIN — Medication 100 MILLIGRAM(S): at 05:22

## 2020-02-07 RX ADMIN — Medication 100 MILLIGRAM(S): at 23:14

## 2020-02-07 RX ADMIN — ENOXAPARIN SODIUM 40 MILLIGRAM(S): 100 INJECTION SUBCUTANEOUS at 15:13

## 2020-02-07 RX ADMIN — Medication 100 MILLIGRAM(S): at 15:12

## 2020-02-07 NOTE — DIETITIAN INITIAL EVALUATION ADULT. - RD TO REMAIN AVAILABLE
Nutrition Intervention: Meals & Snacks vs. Coordination of Care. Monitor: Diet order, energy intake, glucose profile, renal profile, nutrition focused physical findings, body composition./yes

## 2020-02-07 NOTE — PROGRESS NOTE ADULT - SUBJECTIVE AND OBJECTIVE BOX
S; Pt currently denies abdominal pain, N/V.  NGT/perea still in place  O; Vital Signs Last 24 Hrs  T(C): 35.7 (07 Feb 2020 05:15), Max: 36.6 (06 Feb 2020 11:18)  T(F): 96.2 (07 Feb 2020 05:15), Max: 97.8 (06 Feb 2020 11:18)  HR: 88 (07 Feb 2020 05:15) (86 - 94)  BP: 114/75 (07 Feb 2020 05:15) (111/72 - 118/79)  BP(mean): --  RR: 17 (07 Feb 2020 05:15) (16 - 17)  SpO2: 94% (06 Feb 2020 17:16) (89% - 94%)    I&O's Detail    06 Feb 2020 07:01  -  07 Feb 2020 07:00  --------------------------------------------------------  IN:    dextrose 5% + sodium chloride 0.9%.: 1000 mL  Total IN: 1000 mL    OUT:    Bulb: 25 mL (serosanginous)    Indwelling Catheter - Urethral: 850 mL    NGT: scant  Total OUT: 875 mL    Total NET: 125 mL    MEDICATIONS  (STANDING):  acetaminophen   Tablet .. 650 milliGRAM(s) Oral every 6 hours  ciprofloxacin   IVPB 400 milliGRAM(s) IV Intermittent every 12 hours  dextrose 5% + sodium chloride 0.9%. 1000 milliLiter(s) (125 mL/Hr) IV Continuous <Continuous>  heparin  Injectable 5000 Unit(s) SubCutaneous every 8 hours  ketorolac   Injectable 15 milliGRAM(s) IV Push every 6 hours  metroNIDAZOLE  IVPB 500 milliGRAM(s) IV Intermittent every 8 hours  morphine PCA (5 mG/mL) 30 milliLiter(s) PCA Continuous PCA Continuous  pantoprazole  Injectable 40 milliGRAM(s) IV Push daily    MEDICATIONS  (PRN):  phenol 1.4% (CHLORASEPTIC) Oral Spray 2 Spray(s) Topical every 4 hours PRN sore throat    EXAM:  lungs: CTA  CVS: s1s2  abd: soft, distended, no BS; dressing c/d/i; ostomy pink, +sweat in bag; no gas or stool in bag  ext: no LE edema    Labs:                        9.3    7.37  )-----------( 175      ( 07 Feb 2020 07:36 )             29.8       CHEM: pending    Culture - Body Fluid with Gram Stain (collected 05 Feb 2020 19:00)  Source: Peritoneal peritoneal fluid  Gram Stain (06 Feb 2020 08:11):    polymorphonuclear leukocytes seen    No organisms seen    by cytocentrifuge  Preliminary Report (07 Feb 2020 07:56):    No growth    Culture - Urine (collected 04 Feb 2020 17:08)  Source: .Urine Clean Catch (Midstream)  Final Report (06 Feb 2020 00:12):    No growth

## 2020-02-07 NOTE — DIETITIAN INITIAL EVALUATION ADULT. - ENERGY NEEDS
Energy: 1649-3650 kcal/day (MSJx1.2-1.3 AF)    Protein: 81-98 g/day (1-1.2 g/kg ABW) - increased d/t recent post-op    Fluids: 1 mL/kcal

## 2020-02-07 NOTE — CDI QUERY NOTE - NSCDIOTHERTXTBX_GEN_ALL_CORE_HH
Pt admitted with acute perforated diverticulitis  Pt is s/p Alvarez procedure on 2/5/20    Clinical indicators: On admission temp 103, hr. 104 wbc 14                                     Ivab: Cipro 400 mgs q 12 hrs. and Flagyl 500 mgs q 8 hrs.    Please determine1: Was the pt. septic, if so please clarify if present on admission?                                  2: Sepsis was ruled out                                  3: other(please specify)

## 2020-02-07 NOTE — DIETITIAN INITIAL EVALUATION ADULT. - OTHER INFO
Pertinent Medical Information: p/w perforated diverticulitis - POD #2 s/p ex-lap, hartmanns procedure, colostomy. Per progress notes, currently awaiting bowel function prior to removing NGT/clear liquids.    Pertinent Subjective Information: Pt lethargic at time of RD visit. Reports fair appetite & po intake until a few days PTP d/t abd discomfort. Regular diet at home. No restrictions reported. NKFA reported. Demonstrates limited understanding of nutrition concepts - does not demonstrate readiness for diverticulitis nutrition therapy education at this time. UBW unknown, unable to clarify presence of unintentional wt loss. NKFA reported. No supplements reported.

## 2020-02-07 NOTE — DIETITIAN INITIAL EVALUATION ADULT. - PHYSICAL APPEARANCE
BMI: 27.4. Lethargic at time of RD visit. Last BM 2/2. Colostomy noted. Emesis noted 2/5. No chewing/swallowing difficulty reported. Abd noted distended.Skin: surgical incision.

## 2020-02-07 NOTE — DIETITIAN INITIAL EVALUATION ADULT. - ADD RECOMMEND
Recommendation: RD to monitor diet order & feasibility for diet advance. If pt to remain NPO, consider nutrition support team consult.

## 2020-02-07 NOTE — PROGRESS NOTE ADULT - ASSESSMENT
POD #2 s/p ex-lap, hartmanns procedure, colostomy    1. Continue NGT, NPO, perea  2. OOB to chair, ambulate  3.  Continue morphine PCA pump., toradol, tylenol  4. Chloraseptic spray for throat irritation  5. GI/DVT propylaxis  6. Await bowel function prior to removing NGT/clear liquids   7. Continue cipro/flagyl

## 2020-02-07 NOTE — PROGRESS NOTE ADULT - SUBJECTIVE AND OBJECTIVE BOX
ROMAIN SMITH  49y, Male  Allergy: No Known Allergies      CHIEF COMPLAINT: Perforated Diverticulitis (06 Feb 2020 11:04)      INTERVAL EVENTS/HPI  - No acute events overnight  - T(F): , Max: 97.8 (02-06-20 @ 11:18)  - Denies any worsening symptoms  - Tolerating medication    ROS  10 system review - neg     SOCIAL HISTORY - not relevant     Substance Use (  ) never used  (  ) IVDU (  ) Other:  Tobacco Usage:  (   ) never smoked   (   ) former smoker   (   ) current smoker   Alcohol Usage: (   ) social  (   ) daily use (   ) denies  Sexual History:       FH noncontributory     VITALS:  T(F): 96.2, Max: 97.8 (02-06-20 @ 11:18)  HR: 88  BP: 114/75  RR: 17Vital Signs Last 24 Hrs  T(C): 35.7 (07 Feb 2020 05:15), Max: 36.6 (06 Feb 2020 11:18)  T(F): 96.2 (07 Feb 2020 05:15), Max: 97.8 (06 Feb 2020 11:18)  HR: 88 (07 Feb 2020 05:15) (86 - 94)  BP: 114/75 (07 Feb 2020 05:15) (111/72 - 118/79)  BP(mean): --  RR: 17 (07 Feb 2020 05:15) (16 - 17)  SpO2: 94% (06 Feb 2020 17:16) (89% - 94%)    PHYSICAL EXAM:  Gen: NAD, resting in bed  HEENT: Normocephalic, atraumatic. NG tube +   Neck: supple, no lymphadenopathy  CV: s1 s 2 +   Lungs: decreased BS   Abdomen: Soft, MT + Ostomy +   Ext: Warm, well perfused  Neuro: non focal, awake  Skin: no rash, no erythema      TESTS & MEASUREMENTS:                        11.1   9.96  )-----------( 183      ( 06 Feb 2020 08:22 )             35.4     02-06    134<L>  |  100  |  11  ----------------------------<  153<H>  4.6   |  23  |  0.7    Ca    7.4<L>      06 Feb 2020 08:22  Phos  2.6     02-06  Mg     2.2     02-06    TPro  5.0<L>  /  Alb  2.8<L>  /  TBili  0.5  /  DBili  x   /  AST  20  /  ALT  15  /  AlkPhos  41  02-06    eGFR if Non African American: 111 mL/min/1.73M2 (02-06-20 @ 08:22)  eGFR if : 128 mL/min/1.73M2 (02-06-20 @ 08:22)    LIVER FUNCTIONS - ( 06 Feb 2020 08:22 )  Alb: 2.8 g/dL / Pro: 5.0 g/dL / ALK PHOS: 41 U/L / ALT: 15 U/L / AST: 20 U/L / GGT: x               Culture - Body Fluid with Gram Stain (collected 02-05-20 @ 19:00)  Source: Peritoneal peritoneal fluid  Gram Stain (02-06-20 @ 08:11):    polymorphonuclear leukocytes seen    No organisms seen    by cytocentrifuge    Culture - Urine (collected 02-04-20 @ 17:08)  Source: .Urine Clean Catch (Midstream)  Final Report (02-06-20 @ 00:12):    No growth    Culture - Blood (collected 02-04-20 @ 09:47)  Source: .Blood Blood-Peripheral  Preliminary Report (02-05-20 @ 17:01):    No growth to date.    Culture - Blood (collected 02-04-20 @ 09:47)  Source: .Blood Blood-Peripheral  Preliminary Report (02-05-20 @ 17:01):    No growth to date.        Lactate, Blood: 1.3 mmol/L (02-05-20 @ 10:25)  Lactate, Blood: 1.5 mmol/L (02-04-20 @ 06:46)      INFECTIOUS DISEASES TESTING      RADIOLOGY & ADDITIONAL TESTS:  I have personally reviewed the last Chest xray  CXR  Xray Chest 1 View- PORTABLE-Urgent:   EXAM:  XR CHEST PORTABLE URGENT 1V            PROCEDURE DATE:  02/05/2020            INTERPRETATION:  Clinical History / Reason for exam: Severe abdominal pain    Comparison : Chest radiograph 2/4/2020.    Technique/Positioning: Portable frontal view.    Findings:    Support devices: None.    Cardiac/mediastinum/hilum: Unremarkable.    Lung parenchyma/Pleura: Low lung volumes. No consolidation, effusion or pneumothorax..    Skeleton/soft tissues: Unremarkable.    Impression:    Low lung volumeswithout evidence of acute cardiopulmonary disease.                                        SHEBA LEMPERT M.D., ATTENDING RADIOLOGIST  This document has been electronically signed. Feb 5 2020 12:11PM             (02-05-20 @ 12:07)      CT  CT Abdomen and Pelvis w/ IV Cont:   EXAM:  CT ABDOMEN AND PELVIS IC            PROCEDURE DATE:  02/04/2020            INTERPRETATION:  CLINICAL STATEMENT: Abdominal pain and fever      TECHNIQUE: Contiguous axial CT images were obtained from the lower chest to the pubic symphysis after 95 cc of Optiray 320 intravenous contrast.  Oral contrast was not given.  Reformatted images in the coronal and sagittal planes were acquired.    COMPARISON CT: None.      FINDINGS:    LOWER CHEST: Small amount of subsegmental atelectasis.    HEPATOBILIARY: Unremarkable.    SPLEEN: Unremarkable.    PANCREAS: Unremarkable.    ADRENAL GLANDS: Unremarkable.    KIDNEYS: Subcentimeter hypodensities are too small to characterize. There is no hydronephrosis    ABDOMINOPELVIC NODES: Unremarkable.    PELVIC ORGANS: Unremarkable.    PERITONEUM/MESENTERY/BOWEL: There is a moderate amount of free air within the abdomen pelvis. There is focal significant sigmoid wall thickening with diverticula and significant pericolonic inflammatory changes and foci of free air consistent with acute perforated sigmoid diverticulitis. There are mildly prominent adjacent lymph nodes, likely reactive in nature. There is small bowel dilatation, likely representing an ileus related to the diverticulitis. Findings are less likely related to small bowel obstruction, but follow-up is recommended. The appendix is distended, nonspecific. There is a questionable collection between the bladder and sigmoid colon measuring 2.5 x 1.0 cm on image 79 of series 2    BONES/SOFT TISSUES: Degenerative change.    OTHER: Apparent coronary artery calcifications. Trace pericardial effusion, nonspecific. Circumaortic left renal vein.    IMPRESSION:    Acute perforated sigmoid diverticulitis with a moderate amount of free air in theupper abdomen and less in the pelvis.    Questionable small abscess between the sigmoid colon and bladder measuring 2.5 x 1 cm.    Small bowel dilatation, likely represents ileus. Findings are less likely related to small bowel obstruction. However follow-up is recommended.    Spoke with MORIS CELAYA MD on 2/4/2020 7:24 AM with readback.                  ARNULFO DELGADO M.D., ATTENDING RADIOLOGIST  This document has been electronically signed. Feb 4 2020  7:39AM             (02-04-20 @ 07:04)      CARDIOLOGY TESTING  12 Lead ECG:   Ventricular Rate 96 BPM    Atrial Rate 96 BPM    P-R Interval 170 ms    QRS Duration 84 ms    Q-T Interval 352 ms    QTC Calculation(Bezet) 444 ms    P Axis 43 degrees    R Axis 1 degrees    T Axis 25 degrees    Diagnosis Line Normal sinus rhythm  Normal ECG    Confirmed by SARAI HENAO MD ([id number]) on 2/4/2020 1:07:24 PM (02-04-20 @ 07:14)      MEDICATIONS  acetaminophen   Tablet .. 650  ciprofloxacin   IVPB 400  dextrose 5% + sodium chloride 0.9%. 1000  heparin  Injectable 5000  ketorolac   Injectable 15  metroNIDAZOLE  IVPB 500  morphine PCA (5 mG/mL) 30  pantoprazole  Injectable 40      ANTIBIOTICS:  ciprofloxacin   IVPB 400 milliGRAM(s) IV Intermittent every 12 hours  metroNIDAZOLE  IVPB 500 milliGRAM(s) IV Intermittent every 8 hours

## 2020-02-08 LAB
ANION GAP SERPL CALC-SCNC: 10 MMOL/L — SIGNIFICANT CHANGE UP (ref 7–14)
BUN SERPL-MCNC: 9 MG/DL — LOW (ref 10–20)
CALCIUM SERPL-MCNC: 7.9 MG/DL — LOW (ref 8.5–10.1)
CHLORIDE SERPL-SCNC: 104 MMOL/L — SIGNIFICANT CHANGE UP (ref 98–110)
CO2 SERPL-SCNC: 24 MMOL/L — SIGNIFICANT CHANGE UP (ref 17–32)
CREAT SERPL-MCNC: 0.6 MG/DL — LOW (ref 0.7–1.5)
GLUCOSE SERPL-MCNC: 111 MG/DL — HIGH (ref 70–99)
HCT VFR BLD CALC: 28.5 % — LOW (ref 42–52)
HGB BLD-MCNC: 9.2 G/DL — LOW (ref 14–18)
MAGNESIUM SERPL-MCNC: 2.2 MG/DL — SIGNIFICANT CHANGE UP (ref 1.8–2.4)
MCHC RBC-ENTMCNC: 24 PG — LOW (ref 27–31)
MCHC RBC-ENTMCNC: 32.3 G/DL — SIGNIFICANT CHANGE UP (ref 32–37)
MCV RBC AUTO: 74.4 FL — LOW (ref 80–94)
NRBC # BLD: 0 /100 WBCS — SIGNIFICANT CHANGE UP (ref 0–0)
PHOSPHATE SERPL-MCNC: 2.5 MG/DL — SIGNIFICANT CHANGE UP (ref 2.1–4.9)
PLATELET # BLD AUTO: 222 K/UL — SIGNIFICANT CHANGE UP (ref 130–400)
POTASSIUM SERPL-MCNC: 4 MMOL/L — SIGNIFICANT CHANGE UP (ref 3.5–5)
POTASSIUM SERPL-SCNC: 4 MMOL/L — SIGNIFICANT CHANGE UP (ref 3.5–5)
RBC # BLD: 3.83 M/UL — LOW (ref 4.7–6.1)
RBC # FLD: 14.6 % — HIGH (ref 11.5–14.5)
SODIUM SERPL-SCNC: 138 MMOL/L — SIGNIFICANT CHANGE UP (ref 135–146)
WBC # BLD: 6.9 K/UL — SIGNIFICANT CHANGE UP (ref 4.8–10.8)
WBC # FLD AUTO: 6.9 K/UL — SIGNIFICANT CHANGE UP (ref 4.8–10.8)

## 2020-02-08 RX ORDER — ONDANSETRON 8 MG/1
4 TABLET, FILM COATED ORAL EVERY 6 HOURS
Refills: 0 | Status: DISCONTINUED | OUTPATIENT
Start: 2020-02-08 | End: 2020-02-08

## 2020-02-08 RX ORDER — OXYCODONE HYDROCHLORIDE 5 MG/1
5 TABLET ORAL EVERY 6 HOURS
Refills: 0 | Status: DISCONTINUED | OUTPATIENT
Start: 2020-02-08 | End: 2020-02-10

## 2020-02-08 RX ORDER — ONDANSETRON 8 MG/1
4 TABLET, FILM COATED ORAL EVERY 6 HOURS
Refills: 0 | Status: DISCONTINUED | OUTPATIENT
Start: 2020-02-08 | End: 2020-02-15

## 2020-02-08 RX ORDER — IBUPROFEN 200 MG
600 TABLET ORAL EVERY 6 HOURS
Refills: 0 | Status: DISCONTINUED | OUTPATIENT
Start: 2020-02-08 | End: 2020-02-10

## 2020-02-08 RX ORDER — GLYCERIN ADULT
1 SUPPOSITORY, RECTAL RECTAL ONCE
Refills: 0 | Status: COMPLETED | OUTPATIENT
Start: 2020-02-08 | End: 2020-02-08

## 2020-02-08 RX ADMIN — Medication 650 MILLIGRAM(S): at 00:01

## 2020-02-08 RX ADMIN — ENOXAPARIN SODIUM 40 MILLIGRAM(S): 100 INJECTION SUBCUTANEOUS at 13:09

## 2020-02-08 RX ADMIN — Medication 100 MILLIGRAM(S): at 13:10

## 2020-02-08 RX ADMIN — Medication 650 MILLIGRAM(S): at 05:36

## 2020-02-08 RX ADMIN — PANTOPRAZOLE SODIUM 40 MILLIGRAM(S): 20 TABLET, DELAYED RELEASE ORAL at 12:08

## 2020-02-08 RX ADMIN — Medication 650 MILLIGRAM(S): at 16:03

## 2020-02-08 RX ADMIN — Medication 650 MILLIGRAM(S): at 16:33

## 2020-02-08 RX ADMIN — Medication 100 MILLIGRAM(S): at 05:35

## 2020-02-08 RX ADMIN — ONDANSETRON 4 MILLIGRAM(S): 8 TABLET, FILM COATED ORAL at 12:24

## 2020-02-08 RX ADMIN — Medication 1 SUPPOSITORY(S): at 10:22

## 2020-02-08 RX ADMIN — Medication 200 MILLIGRAM(S): at 18:07

## 2020-02-08 RX ADMIN — Medication 200 MILLIGRAM(S): at 05:35

## 2020-02-08 RX ADMIN — Medication 100 MILLIGRAM(S): at 21:20

## 2020-02-08 RX ADMIN — Medication 15 MILLIGRAM(S): at 05:36

## 2020-02-08 NOTE — PROGRESS NOTE ADULT - SUBJECTIVE AND OBJECTIVE BOX
.  POD #3--> s/p Ex-Lap, Cruzito's procedure with creation of colostomy.      Patient seen & examined with Dr. Hawkins.  No acute events noted overnight.  Patient reports mild to moderate incisional pains but feeling slightly better.    Patient tolerated clears earlier without N/V.   Ostomy viable with some stomal edema,  dark fluid and some gas in bag.  Patient voiding clear urine without urinary complaints.    Patient denies subjective fever, chills, tremors, N/V, CP or SOB.         I&O's Detail    07 Feb 2020 07:01  -  08 Feb 2020 07:00  --------------------------------------------------------  IN:    Oral Fluid: 240 mL  Total IN: 240 mL    OUT:    Bulb: 95 mL    Voided: 150 mL  Total OUT: 245 mL    Total NET: -5 mL      08 Feb 2020 07:01  -  08 Feb 2020 11:14  --------------------------------------------------------  IN:  Total IN: 0 mL    OUT:    Bulb: 105 mL  Total OUT: 105 mL    Total NET: -105 mL              MEDICATIONS  (STANDING):  acetaminophen   Tablet .. 650 milliGRAM(s) Oral every 6 hours  ciprofloxacin   IVPB 400 milliGRAM(s) IV Intermittent every 12 hours  dextrose 5% + sodium chloride 0.9%. 1000 milliLiter(s) (75 mL/Hr) IV Continuous <Continuous>  enoxaparin Injectable 40 milliGRAM(s) SubCutaneous daily  ibuprofen  Tablet. 600 milliGRAM(s) Oral every 6 hours  metroNIDAZOLE  IVPB 500 milliGRAM(s) IV Intermittent every 8 hours  pantoprazole  Injectable 40 milliGRAM(s) IV Push daily    MEDICATIONS  (PRN):  oxyCODONE    IR 5 milliGRAM(s) Oral every 6 hours PRN Severe Pain (7 - 10)  phenol 1.4% (CHLORASEPTIC) Oral Spray 2 Spray(s) Topical every 4 hours PRN sore throat  zolpidem 5 milliGRAM(s) Oral at bedtime PRN Insomnia          Vital Signs Last 24 Hrs  T(C): 36.7 (08 Feb 2020 05:00), Max: 37.1 (07 Feb 2020 14:10)  T(F): 98 (08 Feb 2020 05:00), Max: 98.8 (07 Feb 2020 21:37)  HR: 81 (08 Feb 2020 05:00) (77 - 81)  BP: 121/76 (08 Feb 2020 05:00) (104/68 - 121/76)  RR: 16 (08 Feb 2020 05:00) (16 - 17)          Physical Exam:  General:  WD, WN, conversant in NAD.   Neck:  Supple, No JVD.  Chest:  Clear to auscultation bilaterally, Equal expansion bilaterally, equal breath sounds, No W/R/R.  CV:  S1 & S2, RRR, No M/R/G.   Abdomen:  + Bowel sounds, soft, no distention, mild incisional tenderness.  Dressing intact and Incision with some intermittent skin staples in place and 1/2 inch packing in place without bleeding or erythema.  No rebound/guarding or peritoneal signs.    Extremities:  No C/C/E,  No calf tenderness B/L.            LABS:                        9.2    6.90  )-----------( 222      ( 08 Feb 2020 04:30 )             28.5     02-08    138  |  104  |  9<L>  ----------------------------<  111<H>  4.0   |  24  |  0.6<L>    Ca    7.9<L>      08 Feb 2020 04:30  Phos  2.5     02-08  Mg     2.2     02-08 .  POD #3--> s/p Ex-Lap, Cruzito's procedure with creation of colostomy.      Patient seen & examined with Dr. Hawkins.  No acute events noted overnight.  Patient reports mild to moderate incisional pains but feeling slightly better.    Patient tolerated clears earlier without N/V.   Ostomy viable with some stomal edema,  dark fluid and some gas in bag.  Patient voiding clear urine without urinary complaints.    Patient denies subjective fever, chills, tremors, N/V, CP or SOB.         I&O's Detail    07 Feb 2020 07:01  -  08 Feb 2020 07:00  --------------------------------------------------------  IN:    Oral Fluid: 240 mL  Total IN: 240 mL    OUT:    Bulb: 95 mL    Voided: 150 mL  Total OUT: 245 mL    Total NET: -5 mL      08 Feb 2020 07:01  -  08 Feb 2020 11:14  --------------------------------------------------------  IN:  Total IN: 0 mL    OUT:    Bulb: 105 mL  Total OUT: 105 mL    Total NET: -105 mL              MEDICATIONS  (STANDING):  acetaminophen   Tablet .. 650 milliGRAM(s) Oral every 6 hours  ciprofloxacin   IVPB 400 milliGRAM(s) IV Intermittent every 12 hours  dextrose 5% + sodium chloride 0.9%. 1000 milliLiter(s) (75 mL/Hr) IV Continuous <Continuous>  enoxaparin Injectable 40 milliGRAM(s) SubCutaneous daily  ibuprofen  Tablet. 600 milliGRAM(s) Oral every 6 hours  metroNIDAZOLE  IVPB 500 milliGRAM(s) IV Intermittent every 8 hours  pantoprazole  Injectable 40 milliGRAM(s) IV Push daily    MEDICATIONS  (PRN):  oxyCODONE    IR 5 milliGRAM(s) Oral every 6 hours PRN Severe Pain (7 - 10)  phenol 1.4% (CHLORASEPTIC) Oral Spray 2 Spray(s) Topical every 4 hours PRN sore throat  zolpidem 5 milliGRAM(s) Oral at bedtime PRN Insomnia          Vital Signs Last 24 Hrs  T(C): 36.7 (08 Feb 2020 05:00), Max: 37.1 (07 Feb 2020 14:10)  T(F): 98 (08 Feb 2020 05:00), Max: 98.8 (07 Feb 2020 21:37)  HR: 81 (08 Feb 2020 05:00) (77 - 81)  BP: 121/76 (08 Feb 2020 05:00) (104/68 - 121/76)  RR: 16 (08 Feb 2020 05:00) (16 - 17)          Physical Exam:  General:  WD, WN, conversant in NAD.   Neck:  Supple, No JVD.  Chest:  Clear to auscultation bilaterally, Equal expansion bilaterally, equal breath sounds, No W/R/R.  CV:  S1 & S2, RRR, No M/R/G.   Abdomen:  + Bowel sounds, soft, no distention, mild incisional tenderness.  Dressing intact and Incision with some intermittent skin staples in place and 1/2 inch packing in place without bleeding or erythema.  Ostomy viable with some stomal edema,  dark fluid and some gas in bag.  No rebound/guarding or peritoneal signs.    Extremities:  No C/C/E,  No calf tenderness B/L.            LABS:                        9.2    6.90  )-----------( 222      ( 08 Feb 2020 04:30 )             28.5     02-08    138  |  104  |  9<L>  ----------------------------<  111<H>  4.0   |  24  |  0.6<L>    Ca    7.9<L>      08 Feb 2020 04:30  Phos  2.5     02-08  Mg     2.2     02-08

## 2020-02-09 LAB
ANION GAP SERPL CALC-SCNC: 12 MMOL/L — SIGNIFICANT CHANGE UP (ref 7–14)
BUN SERPL-MCNC: 8 MG/DL — LOW (ref 10–20)
CALCIUM SERPL-MCNC: 7.9 MG/DL — LOW (ref 8.5–10.1)
CHLORIDE SERPL-SCNC: 102 MMOL/L — SIGNIFICANT CHANGE UP (ref 98–110)
CO2 SERPL-SCNC: 23 MMOL/L — SIGNIFICANT CHANGE UP (ref 17–32)
CREAT SERPL-MCNC: 0.6 MG/DL — LOW (ref 0.7–1.5)
CULTURE RESULTS: SIGNIFICANT CHANGE UP
CULTURE RESULTS: SIGNIFICANT CHANGE UP
GLUCOSE SERPL-MCNC: 127 MG/DL — HIGH (ref 70–99)
HCT VFR BLD CALC: 29.4 % — LOW (ref 42–52)
HGB BLD-MCNC: 9.3 G/DL — LOW (ref 14–18)
MCHC RBC-ENTMCNC: 23.5 PG — LOW (ref 27–31)
MCHC RBC-ENTMCNC: 31.6 G/DL — LOW (ref 32–37)
MCV RBC AUTO: 74.4 FL — LOW (ref 80–94)
NRBC # BLD: 0 /100 WBCS — SIGNIFICANT CHANGE UP (ref 0–0)
PLATELET # BLD AUTO: 298 K/UL — SIGNIFICANT CHANGE UP (ref 130–400)
POTASSIUM SERPL-MCNC: 3.9 MMOL/L — SIGNIFICANT CHANGE UP (ref 3.5–5)
POTASSIUM SERPL-SCNC: 3.9 MMOL/L — SIGNIFICANT CHANGE UP (ref 3.5–5)
RBC # BLD: 3.95 M/UL — LOW (ref 4.7–6.1)
RBC # FLD: 14.7 % — HIGH (ref 11.5–14.5)
SODIUM SERPL-SCNC: 137 MMOL/L — SIGNIFICANT CHANGE UP (ref 135–146)
SPECIMEN SOURCE: SIGNIFICANT CHANGE UP
SPECIMEN SOURCE: SIGNIFICANT CHANGE UP
WBC # BLD: 7.72 K/UL — SIGNIFICANT CHANGE UP (ref 4.8–10.8)
WBC # FLD AUTO: 7.72 K/UL — SIGNIFICANT CHANGE UP (ref 4.8–10.8)

## 2020-02-09 RX ORDER — SIMETHICONE 80 MG/1
80 TABLET, CHEWABLE ORAL EVERY 8 HOURS
Refills: 0 | Status: DISCONTINUED | OUTPATIENT
Start: 2020-02-09 | End: 2020-02-15

## 2020-02-09 RX ADMIN — Medication 600 MILLIGRAM(S): at 12:27

## 2020-02-09 RX ADMIN — Medication 650 MILLIGRAM(S): at 17:15

## 2020-02-09 RX ADMIN — Medication 600 MILLIGRAM(S): at 17:17

## 2020-02-09 RX ADMIN — ENOXAPARIN SODIUM 40 MILLIGRAM(S): 100 INJECTION SUBCUTANEOUS at 12:18

## 2020-02-09 RX ADMIN — ONDANSETRON 4 MILLIGRAM(S): 8 TABLET, FILM COATED ORAL at 08:43

## 2020-02-09 RX ADMIN — Medication 600 MILLIGRAM(S): at 12:17

## 2020-02-09 RX ADMIN — Medication 650 MILLIGRAM(S): at 17:17

## 2020-02-09 RX ADMIN — Medication 200 MILLIGRAM(S): at 17:16

## 2020-02-09 RX ADMIN — Medication 650 MILLIGRAM(S): at 00:18

## 2020-02-09 RX ADMIN — Medication 650 MILLIGRAM(S): at 12:28

## 2020-02-09 RX ADMIN — Medication 650 MILLIGRAM(S): at 12:18

## 2020-02-09 RX ADMIN — Medication 200 MILLIGRAM(S): at 05:16

## 2020-02-09 RX ADMIN — Medication 600 MILLIGRAM(S): at 17:15

## 2020-02-09 RX ADMIN — Medication 650 MILLIGRAM(S): at 23:26

## 2020-02-09 RX ADMIN — Medication 100 MILLIGRAM(S): at 14:27

## 2020-02-09 RX ADMIN — Medication 100 MILLIGRAM(S): at 05:16

## 2020-02-09 RX ADMIN — PANTOPRAZOLE SODIUM 40 MILLIGRAM(S): 20 TABLET, DELAYED RELEASE ORAL at 12:18

## 2020-02-09 RX ADMIN — Medication 100 MILLIGRAM(S): at 21:37

## 2020-02-09 NOTE — CHART NOTE - NSCHARTNOTEFT_GEN_A_CORE
.    2/9/2020 AM Labs:                            9.3    7.72  )-----------( 298      ( 09 Feb 2020 08:01 )             29.4       02-09    137  |  102  |  8<L>  ----------------------------<  127<H>  3.9   |  23  |  0.6<L>    Ca    7.9<L>      09 Feb 2020 08:01  Phos  2.5     02-08  Mg     2.2     02-08

## 2020-02-09 NOTE — PROGRESS NOTE ADULT - SUBJECTIVE AND OBJECTIVE BOX
.  POD #4--> s/p Ex-Lap, Cruzito's procedure with creation of colostomy.      Patient seen & examined.  No acute events noted overnight.  Tmax=99.0.  Patient reports feels bloated and has gas pains and some nausea overnight and today.  Denies vomiting.   Patient afraid to try clears due to nausea and pain.  RN states patient not using his PCA pump and has not been asking for pain medications.   Patient reports voiding freely w/o urinary complaints.   Patient denies subjective fever, chills, tremors, Vomiting, CP or SOB.     MT drain:  195 mL serosanguinous output last 24 hours.         I&O's Detail    08 Feb 2020 07:01  -  09 Feb 2020 07:00  --------------------------------------------------------  IN:  Total IN: 0 mL    OUT:    Bulb: 195 mL  Total OUT: 195 mL    Total NET: -195 mL            MEDICATIONS  (STANDING):  acetaminophen   Tablet .. 650 milliGRAM(s) Oral every 6 hours  ciprofloxacin   IVPB 400 milliGRAM(s) IV Intermittent every 12 hours  dextrose 5% + sodium chloride 0.9%. 1000 milliLiter(s) (75 mL/Hr) IV Continuous <Continuous>  enoxaparin Injectable 40 milliGRAM(s) SubCutaneous daily  ibuprofen  Tablet. 600 milliGRAM(s) Oral every 6 hours  metroNIDAZOLE  IVPB 500 milliGRAM(s) IV Intermittent every 8 hours  pantoprazole  Injectable 40 milliGRAM(s) IV Push daily    MEDICATIONS  (PRN):  ondansetron Injectable 4 milliGRAM(s) IV Push every 6 hours PRN Nausea and/or Vomiting  oxyCODONE    IR 5 milliGRAM(s) Oral every 6 hours PRN Severe Pain (7 - 10)  phenol 1.4% (CHLORASEPTIC) Oral Spray 2 Spray(s) Topical every 4 hours PRN sore throat  zolpidem 5 milliGRAM(s) Oral at bedtime PRN Insomnia          Vital Signs Last 24 Hrs  T(C): 36.7 (09 Feb 2020 05:39), Max: 37.2 (08 Feb 2020 14:09)  T(F): 98 (09 Feb 2020 05:39), Max: 99 (08 Feb 2020 14:09)  HR: 70 (09 Feb 2020 05:39) (66 - 70)  BP: 147/91 (09 Feb 2020 05:39) (124/80 - 147/91)  RR: 16 (09 Feb 2020 05:39) (16 - 17)          Physical Exam:  General:  WD, WN, conversant in NAD.   Neck:  Supple, No JVD.  Chest:  Clear to auscultation bilaterally, Equal expansion bilaterally, equal breath sounds, No W/R/R.  CV:  S1 & S2, RRR, No M/R/G.   Abdomen:  + Sluggish Bowel sounds, moderate distention and tympanic x 4 quadrants but soft.  Dressing and packing in place over mid lined incision with serosanguinous drainage noted on gauze.  Incisional tenderness to palpation, no rebound/guarding or peritoneal signs.  Ostomy viable with mildly edematous stoma, No significant gas noted in bag and has some dark- reddish colored fluid in collection bag.  MT drain in place with serosanguinous drainage noted.   Extremities:  No C/C/E,  No calf tenderness B/L.            LABS:                        9.2    6.90  )-----------( 222      ( 08 Feb 2020 04:30 )             28.5     02-08    138  |  104  |  9<L>  ----------------------------<  111<H>  4.0   |  24  |  0.6<L>    Ca    7.9<L>      08 Feb 2020 04:30  Phos  2.5     02-08  Mg     2.2     02-08

## 2020-02-10 LAB
ANION GAP SERPL CALC-SCNC: 12 MMOL/L — SIGNIFICANT CHANGE UP (ref 7–14)
BUN SERPL-MCNC: 10 MG/DL — SIGNIFICANT CHANGE UP (ref 10–20)
CALCIUM SERPL-MCNC: 8.2 MG/DL — LOW (ref 8.5–10.1)
CHLORIDE SERPL-SCNC: 102 MMOL/L — SIGNIFICANT CHANGE UP (ref 98–110)
CO2 SERPL-SCNC: 24 MMOL/L — SIGNIFICANT CHANGE UP (ref 17–32)
CREAT SERPL-MCNC: 0.6 MG/DL — LOW (ref 0.7–1.5)
GLUCOSE SERPL-MCNC: 109 MG/DL — HIGH (ref 70–99)
HCT VFR BLD CALC: 31.3 % — LOW (ref 42–52)
HGB BLD-MCNC: 10 G/DL — LOW (ref 14–18)
MCHC RBC-ENTMCNC: 23.8 PG — LOW (ref 27–31)
MCHC RBC-ENTMCNC: 31.9 G/DL — LOW (ref 32–37)
MCV RBC AUTO: 74.3 FL — LOW (ref 80–94)
NRBC # BLD: 0 /100 WBCS — SIGNIFICANT CHANGE UP (ref 0–0)
PLATELET # BLD AUTO: 332 K/UL — SIGNIFICANT CHANGE UP (ref 130–400)
POTASSIUM SERPL-MCNC: 3.9 MMOL/L — SIGNIFICANT CHANGE UP (ref 3.5–5)
POTASSIUM SERPL-SCNC: 3.9 MMOL/L — SIGNIFICANT CHANGE UP (ref 3.5–5)
RBC # BLD: 4.21 M/UL — LOW (ref 4.7–6.1)
RBC # FLD: 14.7 % — HIGH (ref 11.5–14.5)
SODIUM SERPL-SCNC: 138 MMOL/L — SIGNIFICANT CHANGE UP (ref 135–146)
WBC # BLD: 8.9 K/UL — SIGNIFICANT CHANGE UP (ref 4.8–10.8)
WBC # FLD AUTO: 8.9 K/UL — SIGNIFICANT CHANGE UP (ref 4.8–10.8)

## 2020-02-10 RX ORDER — KETOROLAC TROMETHAMINE 30 MG/ML
15 SYRINGE (ML) INJECTION EVERY 6 HOURS
Refills: 0 | Status: DISCONTINUED | OUTPATIENT
Start: 2020-02-10 | End: 2020-02-12

## 2020-02-10 RX ADMIN — Medication 650 MILLIGRAM(S): at 14:43

## 2020-02-10 RX ADMIN — ENOXAPARIN SODIUM 40 MILLIGRAM(S): 100 INJECTION SUBCUTANEOUS at 12:15

## 2020-02-10 RX ADMIN — Medication 100 MILLIGRAM(S): at 14:57

## 2020-02-10 RX ADMIN — Medication 650 MILLIGRAM(S): at 23:06

## 2020-02-10 RX ADMIN — ONDANSETRON 4 MILLIGRAM(S): 8 TABLET, FILM COATED ORAL at 15:31

## 2020-02-10 RX ADMIN — Medication 100 MILLIGRAM(S): at 21:48

## 2020-02-10 RX ADMIN — Medication 200 MILLIGRAM(S): at 05:12

## 2020-02-10 RX ADMIN — PANTOPRAZOLE SODIUM 40 MILLIGRAM(S): 20 TABLET, DELAYED RELEASE ORAL at 12:15

## 2020-02-10 RX ADMIN — Medication 100 MILLIGRAM(S): at 05:13

## 2020-02-10 RX ADMIN — Medication 650 MILLIGRAM(S): at 23:36

## 2020-02-10 RX ADMIN — Medication 200 MILLIGRAM(S): at 17:32

## 2020-02-10 RX ADMIN — Medication 650 MILLIGRAM(S): at 12:16

## 2020-02-10 RX ADMIN — SODIUM CHLORIDE 75 MILLILITER(S): 9 INJECTION, SOLUTION INTRAVENOUS at 17:31

## 2020-02-10 NOTE — PROGRESS NOTE ADULT - SUBJECTIVE AND OBJECTIVE BOX
.  POD #5--> s/p Ex-Lap, Cruzito's procedure with creation of colostomy.      Patient seen & examined.  No acute events noted overnight.  Tmax=97.6.  Patient reports after clears yesterday he had an episode of vomiting.   Patient denies taking any pain medications.   Patient reports voiding freely w/o urinary complaints.   Patient denies subjective fever, chills, tremors, nauesa, abdominal pain, calf pain, CP or SOB.     MT drain:  250 mL serosanguinous output last 24 hours.         I&O's Detail      02-09-20 @ 07:01  -  02-10-20 @ 07:00  --------------------------------------------------------  IN:  Total IN: 0 mL    OUT:    Bulb: 250 mL    Colostomy: 60 mL    Voided: 350 mL  Total OUT: 660 mL    Total NET: -660 mL                  MEDICATIONS  (STANDING):  acetaminophen   Tablet .. 650 milliGRAM(s) Oral every 6 hours  ciprofloxacin   IVPB 400 milliGRAM(s) IV Intermittent every 12 hours  dextrose 5% + sodium chloride 0.9%. 1000 milliLiter(s) (75 mL/Hr) IV Continuous <Continuous>  enoxaparin Injectable 40 milliGRAM(s) SubCutaneous daily  ibuprofen  Tablet. 600 milliGRAM(s) Oral every 6 hours  metroNIDAZOLE  IVPB 500 milliGRAM(s) IV Intermittent every 8 hours  pantoprazole  Injectable 40 milliGRAM(s) IV Push daily    MEDICATIONS  (PRN):  ondansetron Injectable 4 milliGRAM(s) IV Push every 6 hours PRN Nausea and/or Vomiting  oxyCODONE    IR 5 milliGRAM(s) Oral every 6 hours PRN Severe Pain (7 - 10)  phenol 1.4% (CHLORASEPTIC) Oral Spray 2 Spray(s) Topical every 4 hours PRN sore throat  zolpidem 5 milliGRAM(s) Oral at bedtime PRN Insomnia        Vital Signs Last 24 Hrs  T(C): 36.4 (10 Feb 2020 06:20), Max: 36.4 (10 Feb 2020 06:20)  T(F): 97.6 (10 Feb 2020 06:20), Max: 97.6 (10 Feb 2020 06:20)  HR: 68 (10 Feb 2020 06:20) (68 - 78)  BP: 141/92 (10 Feb 2020 06:20) (138/84 - 142/86)  BP(mean): --  RR: 16 (10 Feb 2020 06:20) (16 - 16)  SpO2: --          Physical Exam:  General:  WD, WN, conversant in NAD.   Neck:  Supple, No JVD.  Chest:  Clear to auscultation bilaterally, Equal expansion bilaterally, equal breath sounds, No W/R/R.  CV:  S1 & S2, RRR, No M/R/G.   Abdomen:  + Sluggish Bowel sounds, moderate distention and tympanic x 4 quadrants but soft.  Dressing and packing in place over mid lined incision with serosanguinous drainage noted on gauze.  Incisional tenderness to palpation, no rebound/guarding or peritoneal signs.  Ostomy viable with mildly edematous stoma, No significant gas noted in bag and has some  brown colored fluid in collection bag.  MT drain in place with serosanguinous drainage noted.   Extremities:  No C/C/E,  No calf tenderness B/L.            LABS:                                            10.0   8.90  )-----------( 332      ( 10 Feb 2020 06:40 )             31.3       02-10    138  |  102  |  10  ----------------------------<  109<H>  3.9   |  24  |  0.6<L>    Ca    8.2<L>      10 Feb 2020 06:40                            Lactate Trend  02-05 @ 10:25 Lactate:1.3                   Culture Results:   No growth (02-05 @ 19:00)  Culture Results:   No growth (02-04 @ 17:08)  Culture Results:   No growth at 5 days. (02-04 @ 09:47)  Culture Results:   No growth at 5 days. (02-04 @ 09:47)

## 2020-02-10 NOTE — PROGRESS NOTE ADULT - ASSESSMENT
Patient is a 49y old  Male who presents with a chief complaint of Perforated Diverticulitis.    # Acute Perforated Sigmoid  diverticulitis    would recommend:    1. Continue current Antimicrobials  2. Diet as tolerated  3. Wound care            Attending Attestation:   35 minutes spent on total encounter Patient is a 49y old  Male who presents with a chief complaint of Perforated Diverticulitis, s/p Surgery, namely  Cruzito's procedure.     # Acute Perforated Sigmoid  diverticulitis - s/p Cruzito's procedure.       would recommend:    1. Continue current Antimicrobials  2. Advanced Diet as tolerated  3. Wound care as per Surgery  4. Supportive care , such Antiemetic agents and Pain management as needed      - Spent more than 35 minutes on total encounter

## 2020-02-10 NOTE — PROGRESS NOTE ADULT - ASSESSMENT
POD #5--> s/p Ex-Lap, Cruzito's procedure with creation of colostomy.        Plan:  - cont current treatment - await bowel function  -encourage OOB, IS  - cont clears as tolerated  - monitor Is and Os        All above d/w DR Hawkins and surgical team

## 2020-02-10 NOTE — ADVANCED PRACTICE NURSE CONSULT - ASSESSMENT
L lower lobe colostomy stoma pink  two piece drainable system in place minimal serosanguinous drainage noted in bag,   patient states feeling bubbles in bag , but no out put yet   pt on clear liquids

## 2020-02-10 NOTE — ADVANCED PRACTICE NURSE CONSULT - RECOMMEDATIONS
Teaching initiated with patient on colostomy care, skin care, how to empty drainage bag, measure stoma and change  a complete two piece drainage system   Teaching done via written materials and also via demonstration  Patient verbalise understanding , return demonstration with continuos guidance from wocn   PRODUCT- TWO PIECE  CUT TO FIT DRAINAGE SYSTEM  Case discussed with primary Rn, and [facility]  WOCN to f/u as needed  Patient is going to need continuos teaching  by primary bedside staff until discharge

## 2020-02-10 NOTE — PROGRESS NOTE ADULT - SUBJECTIVE AND OBJECTIVE BOX
Patient is seen and examined at the bed side, is afebrile.        REVIEW OF SYSTEMS: All other review systems are negative        Vital Signs Last 24 Hrs  T(C): 36.8 (10 Feb 2020 22:00), Max: 36.8 (10 Feb 2020 22:00)  T(F): 98.2 (10 Feb 2020 22:00), Max: 98.2 (10 Feb 2020 22:00)  HR: 78 (10 Feb 2020 22:00) (66 - 78)  BP: 138/87 (10 Feb 2020 22:00) (138/86 - 141/92)  BP(mean): --  RR: 16 (10 Feb 2020 22:00) (16 - 16)  SpO2: --        PHYSICAL EXAM:  GENERAL: Not in dsitress  CHEST/LUNG: Air entry bilaterally  HEART: s1 and s2 present   ABDOMEN:   EXTREMITIES:  No pedal edema  CNS: Awake and Alert      MEDICATIONS  (STANDING):  acetaminophen   Tablet .. 650 milliGRAM(s) Oral every 6 hours  ciprofloxacin   IVPB 400 milliGRAM(s) IV Intermittent every 12 hours  dextrose 5% + sodium chloride 0.9%. 1000 milliLiter(s) (75 mL/Hr) IV Continuous <Continuous>  enoxaparin Injectable 40 milliGRAM(s) SubCutaneous daily  metroNIDAZOLE  IVPB 500 milliGRAM(s) IV Intermittent every 8 hours  pantoprazole  Injectable 40 milliGRAM(s) IV Push daily    MEDICATIONS  (PRN):  ketorolac   Injectable 15 milliGRAM(s) IV Push every 6 hours PRN Moderate Pain (4 - 6)  ondansetron Injectable 4 milliGRAM(s) IV Push every 6 hours PRN Nausea and/or Vomiting  phenol 1.4% (CHLORASEPTIC) Oral Spray 2 Spray(s) Topical every 4 hours PRN sore throat  simethicone 80 milliGRAM(s) Chew every 8 hours PRN Gas  zolpidem 5 milliGRAM(s) Oral at bedtime PRN Insomnia        Allergies    No Known Allergies        LABS:                        10.0   8.90  )-----------( 332      ( 10 Feb 2020 06:40 )             31.3       02-10    138  |  102  |  10  ----------------------------<  109<H>  3.9   |  24  |  0.6<L>    Ca    8.2<L>      10 Feb 2020 06:40        RADIOLOGY & ADDITIONAL TESTS:      < from: Xray Chest 1 View- PORTABLE-Urgent (02.05.20 @ 12:07) >  Low lung volumeswithout evidence of acute cardiopulmonary disease.    < end of copied text >    < from: Xray Kidney Ureter Bladder (02.05.20 @ 11:20) >  Dilated small bowel loops are compatible with small bowel obstruction or ileus.    < end of copied text >      < from: CT Abdomen and Pelvis w/ IV Cont (02.04.20 @ 07:04) >  Acute perforated sigmoid diverticulitis with a moderate amount of free air in theupper abdomen and less in the pelvis.    Questionable small abscess between the sigmoid colon and bladder measuring 2.5 x 1 cm.    Small bowel dilatation, likely represents ileus. Findings are less likely related to small bowel obstruction. However follow-up is recommended.    < end of copied text >      MICROBIOLOGY DATA:      Culture - Body Fluid with Gram Stain (02.05.20 @ 19:00)    Gram Stain:   polymorphonuclear leukocytes seen  No organisms seen  by cytocentrifuge    Specimen Source: Peritoneal peritoneal fluid    Culture Results:   No growth        Culture - Urine (02.04.20 @ 17:08)    Specimen Source: .Urine Clean Catch (Midstream)    Culture Results:   No growth      Culture - Blood (02.04.20 @ 09:47)    Specimen Source: .Blood Blood-Peripheral    Culture Results:   No growth at 5 days.        Culture - Blood (02.04.20 @ 09:47)    Specimen Source: .Blood Blood-Peripheral    Culture Results:   No growth at 5 days. Patient is seen and examined at the bed side, is afebrile. He has tolerated the clear liquid diet okay today, the nausea and vomiting has resolved.         REVIEW OF SYSTEMS: All other review systems are negative        Vital Signs Last 24 Hrs  T(C): 36.8 (10 Feb 2020 22:00), Max: 36.8 (10 Feb 2020 22:00)  T(F): 98.2 (10 Feb 2020 22:00), Max: 98.2 (10 Feb 2020 22:00)  HR: 78 (10 Feb 2020 22:00) (66 - 78)  BP: 138/87 (10 Feb 2020 22:00) (138/86 - 141/92)  BP(mean): --  RR: 16 (10 Feb 2020 22:00) (16 - 16)  SpO2: --        PHYSICAL EXAM:  GENERAL: Not in distress   CHEST/LUNG: Air entry bilaterally  HEART: s1 and s2 present   ABDOMEN: Colostomy bag in placed and wound is packed  EXTREMITIES:  No pedal edema  CNS: Awake and Alert          MEDICATIONS  (STANDING):  acetaminophen   Tablet .. 650 milliGRAM(s) Oral every 6 hours  ciprofloxacin   IVPB 400 milliGRAM(s) IV Intermittent every 12 hours  dextrose 5% + sodium chloride 0.9%. 1000 milliLiter(s) (75 mL/Hr) IV Continuous <Continuous>  enoxaparin Injectable 40 milliGRAM(s) SubCutaneous daily  metroNIDAZOLE  IVPB 500 milliGRAM(s) IV Intermittent every 8 hours  pantoprazole  Injectable 40 milliGRAM(s) IV Push daily    MEDICATIONS  (PRN):  ketorolac   Injectable 15 milliGRAM(s) IV Push every 6 hours PRN Moderate Pain (4 - 6)  ondansetron Injectable 4 milliGRAM(s) IV Push every 6 hours PRN Nausea and/or Vomiting  phenol 1.4% (CHLORASEPTIC) Oral Spray 2 Spray(s) Topical every 4 hours PRN sore throat  simethicone 80 milliGRAM(s) Chew every 8 hours PRN Gas  zolpidem 5 milliGRAM(s) Oral at bedtime PRN Insomnia        Allergies    No Known Allergies        LABS:                        10.0   8.90  )-----------( 332      ( 10 Feb 2020 06:40 )             31.3         02-10    138  |  102  |  10  ----------------------------<  109<H>  3.9   |  24  |  0.6<L>    Ca    8.2<L>      10 Feb 2020 06:40        RADIOLOGY & ADDITIONAL TESTS:      < from: Xray Chest 1 View- PORTABLE-Urgent (02.05.20 @ 12:07) >  Low lung volumeswithout evidence of acute cardiopulmonary disease.    < end of copied text >    < from: Xray Kidney Ureter Bladder (02.05.20 @ 11:20) >  Dilated small bowel loops are compatible with small bowel obstruction or ileus.    < end of copied text >      < from: CT Abdomen and Pelvis w/ IV Cont (02.04.20 @ 07:04) >  Acute perforated sigmoid diverticulitis with a moderate amount of free air in theupper abdomen and less in the pelvis.    Questionable small abscess between the sigmoid colon and bladder measuring 2.5 x 1 cm.    Small bowel dilatation, likely represents ileus. Findings are less likely related to small bowel obstruction. However follow-up is recommended.            MICROBIOLOGY DATA:      Culture - Body Fluid with Gram Stain (02.05.20 @ 19:00)    Gram Stain:   polymorphonuclear leukocytes seen  No organisms seen  by cytocentrifuge    Specimen Source: Peritoneal peritoneal fluid    Culture Results:   No growth        Culture - Urine (02.04.20 @ 17:08)    Specimen Source: .Urine Clean Catch (Midstream)    Culture Results:   No growth      Culture - Blood (02.04.20 @ 09:47)    Specimen Source: .Blood Blood-Peripheral    Culture Results:   No growth at 5 days.        Culture - Blood (02.04.20 @ 09:47)    Specimen Source: .Blood Blood-Peripheral    Culture Results:   No growth at 5 days.

## 2020-02-11 LAB
ANION GAP SERPL CALC-SCNC: 14 MMOL/L — SIGNIFICANT CHANGE UP (ref 7–14)
BUN SERPL-MCNC: 9 MG/DL — LOW (ref 10–20)
CALCIUM SERPL-MCNC: 8.8 MG/DL — SIGNIFICANT CHANGE UP (ref 8.5–10.1)
CHLORIDE SERPL-SCNC: 98 MMOL/L — SIGNIFICANT CHANGE UP (ref 98–110)
CO2 SERPL-SCNC: 25 MMOL/L — SIGNIFICANT CHANGE UP (ref 17–32)
CREAT SERPL-MCNC: 0.7 MG/DL — SIGNIFICANT CHANGE UP (ref 0.7–1.5)
CULTURE RESULTS: SIGNIFICANT CHANGE UP
GLUCOSE SERPL-MCNC: 106 MG/DL — HIGH (ref 70–99)
HCT VFR BLD CALC: 34.1 % — LOW (ref 42–52)
HGB BLD-MCNC: 10.9 G/DL — LOW (ref 14–18)
MCHC RBC-ENTMCNC: 23.7 PG — LOW (ref 27–31)
MCHC RBC-ENTMCNC: 32 G/DL — SIGNIFICANT CHANGE UP (ref 32–37)
MCV RBC AUTO: 74.3 FL — LOW (ref 80–94)
NRBC # BLD: 0 /100 WBCS — SIGNIFICANT CHANGE UP (ref 0–0)
PLATELET # BLD AUTO: 430 K/UL — HIGH (ref 130–400)
POTASSIUM SERPL-MCNC: 4.6 MMOL/L — SIGNIFICANT CHANGE UP (ref 3.5–5)
POTASSIUM SERPL-SCNC: 4.6 MMOL/L — SIGNIFICANT CHANGE UP (ref 3.5–5)
RBC # BLD: 4.59 M/UL — LOW (ref 4.7–6.1)
RBC # FLD: 14.9 % — HIGH (ref 11.5–14.5)
SODIUM SERPL-SCNC: 137 MMOL/L — SIGNIFICANT CHANGE UP (ref 135–146)
SPECIMEN SOURCE: SIGNIFICANT CHANGE UP
SURGICAL PATHOLOGY STUDY: SIGNIFICANT CHANGE UP
WBC # BLD: 9.12 K/UL — SIGNIFICANT CHANGE UP (ref 4.8–10.8)
WBC # FLD AUTO: 9.12 K/UL — SIGNIFICANT CHANGE UP (ref 4.8–10.8)

## 2020-02-11 PROCEDURE — 74018 RADEX ABDOMEN 1 VIEW: CPT | Mod: 26

## 2020-02-11 PROCEDURE — 99232 SBSQ HOSP IP/OBS MODERATE 35: CPT

## 2020-02-11 RX ORDER — IOHEXOL 300 MG/ML
30 INJECTION, SOLUTION INTRAVENOUS ONCE
Refills: 0 | Status: COMPLETED | OUTPATIENT
Start: 2020-02-11 | End: 2020-02-11

## 2020-02-11 RX ORDER — SODIUM CHLORIDE 9 MG/ML
1000 INJECTION, SOLUTION INTRAVENOUS
Refills: 0 | Status: DISCONTINUED | OUTPATIENT
Start: 2020-02-11 | End: 2020-02-14

## 2020-02-11 RX ORDER — BENZOCAINE 10 %
1 GEL (GRAM) MUCOUS MEMBRANE THREE TIMES A DAY
Refills: 0 | Status: COMPLETED | OUTPATIENT
Start: 2020-02-11 | End: 2020-02-14

## 2020-02-11 RX ADMIN — Medication 15 MILLIGRAM(S): at 21:48

## 2020-02-11 RX ADMIN — Medication 100 MILLIGRAM(S): at 13:55

## 2020-02-11 RX ADMIN — Medication 15 MILLIGRAM(S): at 22:18

## 2020-02-11 RX ADMIN — Medication 200 MILLIGRAM(S): at 18:21

## 2020-02-11 RX ADMIN — ONDANSETRON 4 MILLIGRAM(S): 8 TABLET, FILM COATED ORAL at 00:39

## 2020-02-11 RX ADMIN — ENOXAPARIN SODIUM 40 MILLIGRAM(S): 100 INJECTION SUBCUTANEOUS at 11:30

## 2020-02-11 RX ADMIN — Medication 650 MILLIGRAM(S): at 05:53

## 2020-02-11 RX ADMIN — Medication 650 MILLIGRAM(S): at 06:23

## 2020-02-11 RX ADMIN — IOHEXOL 30 MILLILITER(S): 300 INJECTION, SOLUTION INTRAVENOUS at 20:07

## 2020-02-11 RX ADMIN — Medication 0.1 MILLIGRAM(S): at 05:52

## 2020-02-11 RX ADMIN — Medication 100 MILLIGRAM(S): at 21:09

## 2020-02-11 RX ADMIN — SODIUM CHLORIDE 125 MILLILITER(S): 9 INJECTION, SOLUTION INTRAVENOUS at 18:21

## 2020-02-11 RX ADMIN — PANTOPRAZOLE SODIUM 40 MILLIGRAM(S): 20 TABLET, DELAYED RELEASE ORAL at 11:30

## 2020-02-11 RX ADMIN — Medication 200 MILLIGRAM(S): at 05:53

## 2020-02-11 RX ADMIN — Medication 100 MILLIGRAM(S): at 05:53

## 2020-02-11 NOTE — CHART NOTE - NSCHARTNOTEFT_GEN_A_CORE
Patient vomited, large bilious emesis. Discussed with Dr. Magdaleno: plan for NG tube decompression, CT with IV and PO contrast, will follow

## 2020-02-11 NOTE — PROGRESS NOTE ADULT - SUBJECTIVE AND OBJECTIVE BOX
.  POD #6--> s/p Ex-Lap, Cruzito's procedure with creation of colostomy.      Patient seen & examined.  No acute events noted overnight.  Tmax=98.5.  Patient states he had some nausea yesterday but no vomiting.  Patient denies requiring any pain medications.   Patient reports voiding freely w/o urinary complaints.   Patient denies subjective fever, chills, tremors, nauesa, abdominal pain, calf pain, CP or SOB today.     MT drain:  190 mL serosanguinous output last 24 hours.         I&O's Detail        02-10-20 @ 07:01  -  02-11-20 @ 07:00  --------------------------------------------------------  IN:    Oral Fluid: 150 mL  Total IN: 150 mL    OUT:    Bulb: 190 mL    Voided: 600 mL  Total OUT: 790 mL    Total NET: -640 mL      02-11-20 @ 07:01  -  02-11-20 @ 10:27  --------------------------------------------------------  IN:    Oral Fluid: 240 mL  Total IN: 240 mL    OUT:    Voided: 300 mL  Total OUT: 300 mL    Total NET: -60 mL                    MEDICATIONS  (STANDING):  acetaminophen   Tablet .. 650 milliGRAM(s) Oral every 6 hours  ciprofloxacin   IVPB 400 milliGRAM(s) IV Intermittent every 12 hours  dextrose 5% + sodium chloride 0.9%. 1000 milliLiter(s) (75 mL/Hr) IV Continuous <Continuous>  enoxaparin Injectable 40 milliGRAM(s) SubCutaneous daily  ibuprofen  Tablet. 600 milliGRAM(s) Oral every 6 hours  metroNIDAZOLE  IVPB 500 milliGRAM(s) IV Intermittent every 8 hours  pantoprazole  Injectable 40 milliGRAM(s) IV Push daily    MEDICATIONS  (PRN):  ondansetron Injectable 4 milliGRAM(s) IV Push every 6 hours PRN Nausea and/or Vomiting  oxyCODONE    IR 5 milliGRAM(s) Oral every 6 hours PRN Severe Pain (7 - 10)  phenol 1.4% (CHLORASEPTIC) Oral Spray 2 Spray(s) Topical every 4 hours PRN sore throat  zolpidem 5 milliGRAM(s) Oral at bedtime PRN Insomnia        Vital Signs Last 24 Hrs  T(C): 36.9 (11 Feb 2020 05:00), Max: 36.9 (11 Feb 2020 05:00)  T(F): 98.5 (11 Feb 2020 05:00), Max: 98.5 (11 Feb 2020 05:00)  HR: 73 (11 Feb 2020 06:59) (66 - 78)  BP: 156/98 (11 Feb 2020 06:59) (138/86 - 156/98)  BP(mean): --  RR: 16 (11 Feb 2020 06:59) (16 - 16)  SpO2: --        Physical Exam:  General:  WD, WN, conversant in NAD.   Neck:  Supple, No JVD.  Chest:  Clear to auscultation bilaterally, Equal expansion bilaterally, equal breath sounds, No W/R/R.  CV:  S1 & S2, RRR, No M/R/G.   Abdomen:  + hypoactive Bowel sounds, mild distention.  Dressing and packing in place over mid lined incision.   Incisional tenderness to palpation, no rebound/guarding or peritoneal signs.  Ostomy viable with mildly edematous stoma, No significant gas noted in bag and has some minimal brown colored fluid in collection bag.  MT drain in place with serosanguinous drainage noted.   Extremities:  No C/C/E,  No calf tenderness B/L.            LABS:                                                      10.0   8.90  )-----------( 332      ( 10 Feb 2020 06:40 )             31.3       02-10    138  |  102  |  10  ----------------------------<  109<H>  3.9   |  24  |  0.6<L>    Ca    8.2<L>      10 Feb 2020 06:40

## 2020-02-11 NOTE — PROGRESS NOTE ADULT - SUBJECTIVE AND OBJECTIVE BOX
ROMAIN SMITH  49y, Male  Allergy: No Known Allergies      CHIEF COMPLAINT: Perforated Diverticulitis (10 Feb 2020 22:56)      INTERVAL EVENTS/HPI  - No acute events overnight  - T(F): , Max: 98.5 (02-11-20 @ 05:00)  - Denies any worsening symptoms  - Tolerating medication    ROS  10 system review - neg     SOCIAL HISTORY - neg     Substance Use (  ) never used  (  ) IVDU (  ) Other:  Tobacco Usage:  (   ) never smoked   (   ) former smoker   (   ) current smoker   Alcohol Usage: (   ) social  (   ) daily use (   ) denies  Sexual History:       FH noncontributory     VITALS:  T(F): 98.5, Max: 98.5 (02-11-20 @ 05:00)  HR: 73  BP: 156/98  RR: 16Vital Signs Last 24 Hrs  T(C): 36.9 (11 Feb 2020 05:00), Max: 36.9 (11 Feb 2020 05:00)  T(F): 98.5 (11 Feb 2020 05:00), Max: 98.5 (11 Feb 2020 05:00)  HR: 73 (11 Feb 2020 06:59) (66 - 78)  BP: 156/98 (11 Feb 2020 06:59) (138/86 - 156/98)  BP(mean): --  RR: 16 (11 Feb 2020 06:59) (16 - 16)  SpO2: --    PHYSICAL EXAM:  Gen: NAD, resting in bed  HEENT: Normocephalic, atraumatic  Neck: supple, no lymphadenopathy  CV: s1 s 2 +   Lungs: clear   Abdomen: Soft, BS present. MT in situ   Ext: Warm, well perfused  Neuro: non focal, awake  Skin: no rash, no erythema      TESTS & MEASUREMENTS:                        10.0   8.90  )-----------( 332      ( 10 Feb 2020 06:40 )             31.3     02-10    138  |  102  |  10  ----------------------------<  109<H>  3.9   |  24  |  0.6<L>    Ca    8.2<L>      10 Feb 2020 06:40              Culture - Body Fluid with Gram Stain (collected 02-05-20 @ 19:00)  Source: Peritoneal peritoneal fluid  Gram Stain (02-06-20 @ 08:11):    polymorphonuclear leukocytes seen    No organisms seen    by cytocentrifuge  Preliminary Report (02-07-20 @ 07:56):    No growth    Culture - Urine (collected 02-04-20 @ 17:08)  Source: .Urine Clean Catch (Midstream)  Final Report (02-06-20 @ 00:12):    No growth    Culture - Blood (collected 02-04-20 @ 09:47)  Source: .Blood Blood-Peripheral  Final Report (02-09-20 @ 17:01):    No growth at 5 days.    Culture - Blood (collected 02-04-20 @ 09:47)  Source: .Blood Blood-Peripheral  Final Report (02-09-20 @ 17:00):    No growth at 5 days.            INFECTIOUS DISEASES TESTING      RADIOLOGY & ADDITIONAL TESTS:        CARDIOLOGY TESTING  12 Lead ECG:   Ventricular Rate 96 BPM    Atrial Rate 96 BPM    P-R Interval 170 ms    QRS Duration 84 ms    Q-T Interval 352 ms    QTC Calculation(Bezet) 444 ms    P Axis 43 degrees    R Axis 1 degrees    T Axis 25 degrees    Diagnosis Line Normal sinus rhythm  Normal ECG    Confirmed by SARAI HENAO MD ([id number]) on 2/4/2020 1:07:24 PM (02-04-20 @ 07:14)      MEDICATIONS  acetaminophen   Tablet .. 650  ciprofloxacin   IVPB 400  dextrose 5% + sodium chloride 0.9%. 1000  enoxaparin Injectable 40  metroNIDAZOLE  IVPB 500  pantoprazole  Injectable 40      ANTIBIOTICS:  ciprofloxacin   IVPB 400 milliGRAM(s) IV Intermittent every 12 hours  metroNIDAZOLE  IVPB 500 milliGRAM(s) IV Intermittent every 8 hours

## 2020-02-11 NOTE — CONSULT NOTE ADULT - SUBJECTIVE AND OBJECTIVE BOX
ROMAIN SMITH  49y, Male  Allergy: No Known Allergies    HPI   50 yo male with no past medical history s/p Alvarez's procedure with creation of colostomy. Medical consult called for management of HTN. Patient denied history or family history of hypertension. Has no chest pain, sob, palpitations.       REVIEW OF SYSTEMS:    CONSTITUTIONAL: No weakness, fevers or chills  EYES/ENT: No visual changes;  No vertigo or throat pain   NECK: No pain or stiffness  RESPIRATORY: No cough, wheezing, hemoptysis; No shortness of breath  CARDIOVASCULAR: No chest pain or palpitations  GASTROINTESTINAL: No abdominal or epigastric pain. No nausea, vomiting, or hematemesis; No diarrhea or constipation. No melena or hematochezia.  GENITOURINARY: No dysuria, frequency or hematuria  NEUROLOGICAL: No numbness or weakness  SKIN: No itching, rashes  All other systems reviewed and negative    PAST MEDICAL & SURGICAL HISTORY:  No pertinent past medical history: diverticulitis (early 2000&#x27;s)  No significant past surgical history    FAMILY HISTORY:  Reviewed and negative for HTN    Social History:    Denies ETOH, drug, or tobacco use (04 Feb 2020 09:48)      VITALS:  T(F): 98.5 (02-11-20 @ 05:00), Max: 98.5 (02-11-20 @ 05:00)  HR: 79 (02-11-20 @ 10:52)  BP: 153/98 (02-11-20 @ 10:52) (138/86 - 156/98)  RR: 16 (02-11-20 @ 06:59)  SpO2: --    General: WN/WD NAD afebrile  Neurology: A&Ox3, nonfocal   Eyes: PERRLA/ EOMI, Gross vision intact  ENT/Neck: Neck supple, trachea midline, No JVD, Gross hearing intact  Respiratory: CTA B/L, No wheezing, rales, rhonchi  CV: RRR, S1S2, no murmurs, rubs or gallops  Abdominal: Soft, NT, ND +BS,   Extremities: No edema, + peripheral pulses  Skin: No Rashes, Hematoma, Ecchymosis      TESTS & MEASUREMENTS:      02-09-20 @ 07:01  -  02-10-20 @ 07:00  --------------------------------------------------------  IN: 0 mL / OUT: 660 mL / NET: -660 mL    02-10-20 @ 07:01  -  02-11-20 @ 07:00  --------------------------------------------------------  IN: 150 mL / OUT: 790 mL / NET: -640 mL    02-11-20 @ 07:01  -  02-11-20 @ 12:56  --------------------------------------------------------  IN: 240 mL / OUT: 300 mL / NET: -60 mL                            10.0   8.90  )-----------( 332      ( 10 Feb 2020 06:40 )             31.3       02-10    138  |  102  |  10  ----------------------------<  109<H>  3.9   |  24  |  0.6<L>    Ca    8.2<L>      10 Feb 2020 06:40          Culture - Body Fluid with Gram Stain (collected 02-05-20 @ 19:00)  Source: Peritoneal peritoneal fluid  Gram Stain (02-06-20 @ 08:11):    polymorphonuclear leukocytes seen    No organisms seen    by cytocentrifuge  Final Report (02-11-20 @ 07:44):    No growth at 5 days    Culture - Urine (collected 02-04-20 @ 17:08)  Source: .Urine Clean Catch (Midstream)  Final Report (02-06-20 @ 00:12):    No growth          RADIOLOGY & ADDITIONAL TESTS:    MEDICATIONS:  MEDICATIONS  (STANDING):  acetaminophen   Tablet .. 650 milliGRAM(s) Oral every 6 hours  ciprofloxacin   IVPB 400 milliGRAM(s) IV Intermittent every 12 hours  dextrose 5% + sodium chloride 0.9%. 1000 milliLiter(s) (75 mL/Hr) IV Continuous <Continuous>  enoxaparin Injectable 40 milliGRAM(s) SubCutaneous daily  metroNIDAZOLE  IVPB 500 milliGRAM(s) IV Intermittent every 8 hours  pantoprazole  Injectable 40 milliGRAM(s) IV Push daily    MEDICATIONS  (PRN):  ketorolac   Injectable 15 milliGRAM(s) IV Push every 6 hours PRN Moderate Pain (4 - 6)  ondansetron Injectable 4 milliGRAM(s) IV Push every 6 hours PRN Nausea and/or Vomiting  phenol 1.4% (CHLORASEPTIC) Oral Spray 2 Spray(s) Topical every 4 hours PRN sore throat  simethicone 80 milliGRAM(s) Chew every 8 hours PRN Gas  zolpidem 5 milliGRAM(s) Oral at bedtime PRN Insomnia      HEALTH ISSUES - PROBLEM Dx:  Perforated diverticulum: Perforated diverticulum

## 2020-02-11 NOTE — PROGRESS NOTE ADULT - ASSESSMENT
POD #6--> s/p Ex-Lap, Cruzito's procedure with creation of colostomy.        Plan:  - cont current treatment - await bowel function  - fup labs  - encourage OOB, IS  - cont clears as tolerated  - monitor Is and Os        All above d/w DR Currie and surgical team

## 2020-02-11 NOTE — CHART NOTE - NSCHARTNOTEFT_GEN_A_CORE
Called by RN to evaluate NGT for displacement.    Pt states he moved and it pulled out. NGT at 10cm    Reinserted NGT to 60cm with +air ascultated over gastric area. XRAY Abdomen ordered STAT to confirm placement.    RN aware and knows NOT to connect back to suction.

## 2020-02-12 DIAGNOSIS — K56.609 UNSPECIFIED INTESTINAL OBSTRUCTION, UNSPECIFIED AS TO PARTIAL VERSUS COMPLETE OBSTRUCTION: ICD-10-CM

## 2020-02-12 LAB
ANION GAP SERPL CALC-SCNC: 15 MMOL/L — HIGH (ref 7–14)
BUN SERPL-MCNC: 8 MG/DL — LOW (ref 10–20)
CALCIUM SERPL-MCNC: 8.5 MG/DL — SIGNIFICANT CHANGE UP (ref 8.5–10.1)
CHLORIDE SERPL-SCNC: 101 MMOL/L — SIGNIFICANT CHANGE UP (ref 98–110)
CO2 SERPL-SCNC: 22 MMOL/L — SIGNIFICANT CHANGE UP (ref 17–32)
CREAT SERPL-MCNC: 0.7 MG/DL — SIGNIFICANT CHANGE UP (ref 0.7–1.5)
GLUCOSE SERPL-MCNC: 113 MG/DL — HIGH (ref 70–99)
HCT VFR BLD CALC: 33.6 % — LOW (ref 42–52)
HGB BLD-MCNC: 10.8 G/DL — LOW (ref 14–18)
MAGNESIUM SERPL-MCNC: 2.1 MG/DL — SIGNIFICANT CHANGE UP (ref 1.8–2.4)
MCHC RBC-ENTMCNC: 23.8 PG — LOW (ref 27–31)
MCHC RBC-ENTMCNC: 32.1 G/DL — SIGNIFICANT CHANGE UP (ref 32–37)
MCV RBC AUTO: 74 FL — LOW (ref 80–94)
NRBC # BLD: 0 /100 WBCS — SIGNIFICANT CHANGE UP (ref 0–0)
PHOSPHATE SERPL-MCNC: 3.6 MG/DL — SIGNIFICANT CHANGE UP (ref 2.1–4.9)
PLATELET # BLD AUTO: 375 K/UL — SIGNIFICANT CHANGE UP (ref 130–400)
POTASSIUM SERPL-MCNC: 4.2 MMOL/L — SIGNIFICANT CHANGE UP (ref 3.5–5)
POTASSIUM SERPL-SCNC: 4.2 MMOL/L — SIGNIFICANT CHANGE UP (ref 3.5–5)
RBC # BLD: 4.54 M/UL — LOW (ref 4.7–6.1)
RBC # FLD: 14.9 % — HIGH (ref 11.5–14.5)
SODIUM SERPL-SCNC: 138 MMOL/L — SIGNIFICANT CHANGE UP (ref 135–146)
TRIGL SERPL-MCNC: 193 MG/DL — HIGH (ref 10–149)
WBC # BLD: 9.15 K/UL — SIGNIFICANT CHANGE UP (ref 4.8–10.8)
WBC # FLD AUTO: 9.15 K/UL — SIGNIFICANT CHANGE UP (ref 4.8–10.8)

## 2020-02-12 PROCEDURE — 74178 CT ABD&PLV WO CNTR FLWD CNTR: CPT | Mod: 26

## 2020-02-12 RX ORDER — I.V. FAT EMULSION 20 G/100ML
1.23 EMULSION INTRAVENOUS
Qty: 100.37 | Refills: 0 | Status: DISCONTINUED | OUTPATIENT
Start: 2020-02-12 | End: 2020-02-12

## 2020-02-12 RX ORDER — DIPHENHYDRAMINE HCL 50 MG
25 CAPSULE ORAL ONCE
Refills: 0 | Status: COMPLETED | OUTPATIENT
Start: 2020-02-12 | End: 2020-02-12

## 2020-02-12 RX ORDER — CIPROFLOXACIN LACTATE 400MG/40ML
400 VIAL (ML) INTRAVENOUS EVERY 12 HOURS
Refills: 0 | Status: COMPLETED | OUTPATIENT
Start: 2020-02-12 | End: 2020-02-12

## 2020-02-12 RX ORDER — ELECTROLYTE SOLUTION,INJ
1 VIAL (ML) INTRAVENOUS
Refills: 0 | Status: DISCONTINUED | OUTPATIENT
Start: 2020-02-12 | End: 2020-02-13

## 2020-02-12 RX ORDER — METRONIDAZOLE 500 MG
500 TABLET ORAL EVERY 8 HOURS
Refills: 0 | Status: COMPLETED | OUTPATIENT
Start: 2020-02-12 | End: 2020-02-12

## 2020-02-12 RX ADMIN — ENOXAPARIN SODIUM 40 MILLIGRAM(S): 100 INJECTION SUBCUTANEOUS at 12:06

## 2020-02-12 RX ADMIN — Medication 200 MILLIGRAM(S): at 05:14

## 2020-02-12 RX ADMIN — SODIUM CHLORIDE 125 MILLILITER(S): 9 INJECTION, SOLUTION INTRAVENOUS at 12:10

## 2020-02-12 RX ADMIN — I.V. FAT EMULSION 62.73 GM/KG/DAY: 20 EMULSION INTRAVENOUS at 22:25

## 2020-02-12 RX ADMIN — Medication 1 EACH: at 22:24

## 2020-02-12 RX ADMIN — Medication 100 MILLIGRAM(S): at 14:46

## 2020-02-12 RX ADMIN — Medication 200 MILLIGRAM(S): at 17:35

## 2020-02-12 RX ADMIN — Medication 25 MILLIGRAM(S): at 22:57

## 2020-02-12 RX ADMIN — Medication 1 SPRAY(S): at 00:37

## 2020-02-12 RX ADMIN — Medication 100 MILLIGRAM(S): at 20:39

## 2020-02-12 RX ADMIN — Medication 100 MILLIGRAM(S): at 05:10

## 2020-02-12 RX ADMIN — Medication 15 MILLIGRAM(S): at 05:14

## 2020-02-12 RX ADMIN — PANTOPRAZOLE SODIUM 40 MILLIGRAM(S): 20 TABLET, DELAYED RELEASE ORAL at 12:06

## 2020-02-12 RX ADMIN — SODIUM CHLORIDE 125 MILLILITER(S): 9 INJECTION, SOLUTION INTRAVENOUS at 19:45

## 2020-02-12 RX ADMIN — SODIUM CHLORIDE 125 MILLILITER(S): 9 INJECTION, SOLUTION INTRAVENOUS at 05:18

## 2020-02-12 NOTE — PROGRESS NOTE ADULT - PROBLEM SELECTOR PLAN 1
acute   Neg cx  afebrile and normal wbc   DC abx in 24 hours  recall if needed
s/p surgery   Wbc down   afebrile   source controlled   once tolerating PO - likely complete 7 days post op abx   DC planning per surgical team
* POD #3--> s/p Ex-Lap, Cruzito's procedure with creation of colostomy.    - Case d/w Dr. Magdaleno and Dr. Hawkins and state:  1. Start Clear liquids this am since no N/V or severe pain/distention.  2. Give Glycerine suppository into Ostomy.  (With Dr. Hawkins present, one Glycerine suppository was gently placed into Ostomy with KY lubricating jelly without resistance and patient tolerated insertion well).    3. Pain medications as already ordered with Toradol/Tylenol but do not order any additional narcotics without speaking to Resident or Dr. Magdaleno.       -  Ivabx with Cipro & Flagyl.  -  Repeat labs ordered for AM.  -  Encourage OOB to chair and ambulation.  -  Monitor for bowel / Ostomy function.  -  Surgery resident changed packing and dressing this am and Nursing can continue daily starting in am.    - Nursing for Ostomy care.  - VTE prophylaxis with Lovenox.  - GI prophylaxis with Protonix and Zofran PRN N/V.  - Case d/w Dr. Hawkins & Dr. Magdaleno.  - Will follow.
.  POD #4--> s/p Ex-Lap, Cruzito's procedure with creation of colostomy.    - Pain medications as already ordered with Toradol/Tylenol.  Will discontinue PCA pump as patient not been using per RN.  Per Dr. Magdaleno, do not order any additional narcotics without speaking to Resident or Dr. Magdaleno.     -  Ivabx with Cipro & Flagyl.  -  F/U am labs and repeat labs ordered for AM.  -  I counseled patient of the importance of getting OOB and ambulate multiple times per hour to help stimulate bowel function and counseled patient of the risks of being bed bound.   RN instructed to encourage patient to be OOB to chair and ambulation.  Patient verbalizes understanding.    - Encouraged incentive spirometer 10 x per hour.  - Monitor for bowel / Ostomy function.  - Continue daily packing and dressing changes to mid line incision.   - Continue Ostomy care per protocol.  - VTE prophylaxis with Lovenox.  - GI prophylaxis with Protonix and Zofran PRN N/V.  - Case d/w Dr. Rolwand & Dr. Magdaleno.
.  POD #4--> s/p Ex-Lap, Cruzito's procedure with creation of colostomy.    - Pain medications as already ordered with Toradol/Tylenol.  Will discontinue PCA pump as patient not been using per RN.  Per Dr. Magdaleno, do not order any additional narcotics without speaking to Resident or Dr. Magdaleno.     -  Ivabx with Cipro & Flagyl.  -  F/U am labs and repeat labs ordered for AM.  -  I counseled patient of the importance of getting OOB and ambulate multiple times per hour to help stimulate bowel function and counseled patient of the risks of being bed bound.   RN instructed to encourage patient to be OOB to chair and ambulation.  Patient verbalizes understanding.    - Encouraged incentive spirometer 10 x per hour.  - Monitor for bowel / Ostomy function.  - Continue daily packing and dressing changes to mid line incision.   - Continue Ostomy care per protocol.  - VTE prophylaxis with Lovenox.  - GI prophylaxis with Protonix and Zofran PRN N/V.  - Case d/w Dr. Rowland & Dr. Magdaleno.
.  POD #4--> s/p Ex-Lap, Cruzito's procedure with creation of colostomy.    - Pain medications as already ordered with Toradol/Tylenol.  Will discontinue PCA pump as patient not been using per RN.  Per Dr. Magdaleno, do not order any additional narcotics without speaking to Resident or Dr. Magdaleno.     -  Ivabx with Cipro & Flagyl.  -  F/U am labs and repeat labs ordered for AM.  -  I counseled patient of the importance of getting OOB and ambulate multiple times per hour to help stimulate bowel function and counseled patient of the risks of being bed bound.   RN instructed to encourage patient to be OOB to chair and ambulation.  Patient verbalizes understanding.    - Encouraged incentive spirometer 10 x per hour.  - Monitor for bowel / Ostomy function.  - Continue daily packing and dressing changes to mid line incision.   - Continue Ostomy care per protocol.  - VTE prophylaxis with Lovenox.  - GI prophylaxis with Protonix and Zofran PRN N/V.  - Case d/w Dr. Rowland & Dr. Magdaleno.
as above

## 2020-02-12 NOTE — PROGRESS NOTE ADULT - SUBJECTIVE AND OBJECTIVE BOX
HPI:  pt denies abd pain n/v, reports gas in ostomy.      PAST MEDICAL & SURGICAL HISTORY:  No pertinent past medical history: diverticulitis (early 2000&#x27;s)  No significant past surgical history      Allergies    No Known Allergies      MEDICATIONS  (STANDING):  acetaminophen   Tablet .. 650 milliGRAM(s) Oral every 6 hours  benzocaine 20% Spray 1 Spray(s) Topical three times a day  ciprofloxacin   IVPB 400 milliGRAM(s) IV Intermittent every 12 hours  dextrose 5% + sodium chloride 0.9%. 1000 milliLiter(s) (125 mL/Hr) IV Continuous <Continuous>  enoxaparin Injectable 40 milliGRAM(s) SubCutaneous daily  metroNIDAZOLE  IVPB 500 milliGRAM(s) IV Intermittent every 8 hours  pantoprazole  Injectable 40 milliGRAM(s) IV Push daily    MEDICATIONS  (PRN):  ketorolac   Injectable 15 milliGRAM(s) IV Push every 6 hours PRN Moderate Pain (4 - 6)  ondansetron Injectable 4 milliGRAM(s) IV Push every 6 hours PRN Nausea and/or Vomiting  phenol 1.4% (CHLORASEPTIC) Oral Spray 2 Spray(s) Topical every 4 hours PRN sore throat  simethicone 80 milliGRAM(s) Chew every 8 hours PRN Gas  zolpidem 5 milliGRAM(s) Oral at bedtime PRN Insomnia      ROS:  General:	no fever, weight loss,  chills  Respiratory and Thorax: no cough, wheeze,  sob  Cardiovascular:	no chest pain, palpitations, dizziness  Gastrointestinal:	no nausea, vomiting, diarrhea, abd pain  Genitourinary:	no dysuria, hematuria          Vital Signs Last 24 Hrs  T(F): 97.2 (12 Feb 2020 06:00), Max: 97.2 (12 Feb 2020 06:00)  HR: 82 (12 Feb 2020 06:00) (74 - 82)  BP: 137/95 (12 Feb 2020 06:00) (137/95 - 153/98)  RR: 18 (12 Feb 2020 06:00) (16 - 18)      PHYSICAL EXAM:      Constitutional: A&Ox4  Respiratory: cta b/l  Cardiovascular: s1 s2 rrr  Gastrointestinal: soft nt  nd + bs no rebound or guarding +gas in ostomy bag no stool  Genitourinary: no cva tenderness  Extremities: normal rom, no edema, calf tenderness                              10.8   9.15  )-----------( 375      ( 12 Feb 2020 08:54 )             33.6       02-11    137  |  98  |  9<L>  ----------------------------<  106<H>  4.6   |  25  |  0.7    Ca    8.8      11 Feb 2020 11:37

## 2020-02-12 NOTE — CHART NOTE - NSCHARTNOTEFT_GEN_A_CORE
< from: CT Abdomen and Pelvis w/ Oral Cont and w/ IV Cont (02.12.20 @ 00:19) >    IMPRESSION:     1.  Post Alvarez's procedure with multiple fluid-filled dilated loops of small bowel measuring up to approximately 4.6 cm with a gradual transition to normal caliber in the mid abdomen, suggestive of ileus versus less likely small bowel obstruction. Continued follow-up is recommended    2.  Inflammatory changes within the pelvis, with a 2.0 x 1.4 cm rim-enhancing fluid collection consistent with a small abscess or postoperative seroma.    3.  New left lower lobe patchy airspace opacities, may be infectious in nature.    < end of copied text >          < from: CT Abdomen and Pelvis No Cont (02.12.20 @ 10:47) >    IMPRESSION:     1.  Mild improvement in small bowel dilatation with oral contrast reaching the left colon.    2.  Previously described 2.0 x 1.4 cm rim-enhancing fluid collection in the pelvis is not clearly visualized on this exam, likely due to to lack of intravenous contrast administration.  3.  Stable left lower lobe patchy opacities, which may represent pneumonia.          Followed up on CT scan and discussed results with pt and surgical team. Pt now has gas and stool in ostomy.       IMP--resolving SBO  Plan in am to clamp vs dc ngt and start clear. < from: CT Abdomen and Pelvis w/ Oral Cont and w/ IV Cont (02.12.20 @ 00:19) >    IMPRESSION:     1.  Post Alvarez's procedure with multiple fluid-filled dilated loops of small bowel measuring up to approximately 4.6 cm with a gradual transition to normal caliber in the mid abdomen, suggestive of ileus versus less likely small bowel obstruction. Continued follow-up is recommended    2.  Inflammatory changes within the pelvis, with a 2.0 x 1.4 cm rim-enhancing fluid collection consistent with a small abscess or postoperative seroma.    3.  New left lower lobe patchy airspace opacities, may be infectious in nature.    < end of copied text >          < from: CT Abdomen and Pelvis No Cont (02.12.20 @ 10:47) >    IMPRESSION:     1.  Mild improvement in small bowel dilatation with oral contrast reaching the left colon.    2.  Previously described 2.0 x 1.4 cm rim-enhancing fluid collection in the pelvis is not clearly visualized on this exam, likely due to to lack of intravenous contrast administration.  3.  Stable left lower lobe patchy opacities, which may represent pneumonia.          Followed up on CT scan and discussed results with pt and surgical team. Pt now has gas and stool in ostomy.       IMP--resolving SBO  Plan in am to clamp vs dc ngt and start clears.  Complete abx today.  F/u nutrition consult (was ordered in anticipation of prolong NPO).

## 2020-02-12 NOTE — PROGRESS NOTE ADULT - PROBLEM SELECTOR PROBLEM 1
Perforated diverticulum

## 2020-02-12 NOTE — PROGRESS NOTE ADULT - ASSESSMENT
pt is a 48yo M with perforated diverticulitis s/p ex lap ,colon resection, and colostomy POD#7, c/b SBO.    -ct scan done at 1230am showing SBO, f/u official report  -d/w surgical team, will need repeat study today to see if any progression of contrast.   -c/w npo, ivf, ngt suction  -c/w iv abx  -serial lab and exam

## 2020-02-12 NOTE — CHART NOTE - NSCHARTNOTEFT_GEN_A_CORE
Registered Dietitian Follow-Up     Patient Profile Reviewed                           Yes [x]   No []     Nutrition History Previously Obtained        Yes [x]  No []      Pertinent Medical Interventions: Pt admitted with perforated diverticulitis. Noted pt "feels much better today", large bm earlier today, passes small amounts of flatus via stoma thru the day per surgical progress notes. Abd noted softer and less distended compared to yesterday. Noted plan to maintain NPO and NGT overnight; if pt continues to do well, noted plan to clamp NGT in am and remove if 4-hr residual is satisfactory; then advance diet slowly.     Diet order: NPO with hard candy (hard candy added today).     Anthropometrics:  - Ht. 172.72 cm.  - Wt. 91 kg (2/7) vs. 81.6 kg (2/4) - both wts noted as actual. Will continue to use dosing wt 81.6 kg for assessment. Requested new wt from RN when able.  - BMI 27.4 (using dosing wt 81.6 kg)  - IBW 70 kg.     Pertinent Lab Data: 2/12: RBC-4.54, H/H-10.8/33.6, BUN-8, gluc-113     Pertinent Meds: enoxaparin, dextrose 5% + 0.9% NaCl 1 L solution at 125 mL/hr (170 kcal total), simethicone, zofran, protonix     Physical Findings:  - Appearance: alert  - GI function: last BM today; abd noted distended. Colostomy.  - Tubes: NG tube  - Oral/Mouth cavity: no chewing/swallowing difficulty reported at this time  - Skin: surgical incision, stoma     Nutrition Requirements  Weight Used: 81.6 kg per 2/7 RD assessment     Estimated Energy Needs    Continue [x]  Adjust []  Energy: 2380-9133 kcal/day (MSJx1.2-1.3 AF)        Estimated Protein Needs    Continue [x]  Adjust []  Protein: 81-98 g/day (1-1.2 g/kg ABW) - increased d/t recent post-op        Estimated Fluid Needs        Continue [x]  Adjust []  Fluids: 1 mL/kcal     Nutrient Intake: Not meeting estimated nutrient needs at this time.        [x] Previous Nutrition Diagnosis: Inadequate energy intake            [x] Ongoing          [] Resolved     Nutrition Intervention: Coordination of Care     Goal/Expected Outcome: Nutrition regimen determined & initiated as medically feasible over next 2 days.     Indicator/Monitoring: Diet order, energy intake, glucose profile, renal profile, nutrition focused physical findings, body composition.    Recommendation: RD to monitor diet order & feasibility for diet advance. If unable to advance to po diet, consult nutrition support team consult.

## 2020-02-13 LAB
ANION GAP SERPL CALC-SCNC: 14 MMOL/L — SIGNIFICANT CHANGE UP (ref 7–14)
BUN SERPL-MCNC: 8 MG/DL — LOW (ref 10–20)
CALCIUM SERPL-MCNC: 9 MG/DL — SIGNIFICANT CHANGE UP (ref 8.5–10.1)
CHLORIDE SERPL-SCNC: 99 MMOL/L — SIGNIFICANT CHANGE UP (ref 98–110)
CO2 SERPL-SCNC: 25 MMOL/L — SIGNIFICANT CHANGE UP (ref 17–32)
CREAT SERPL-MCNC: <0.5 MG/DL — LOW (ref 0.7–1.5)
GLUCOSE SERPL-MCNC: 106 MG/DL — HIGH (ref 70–99)
HCT VFR BLD CALC: 34.4 % — LOW (ref 42–52)
HGB BLD-MCNC: 11.4 G/DL — LOW (ref 14–18)
MAGNESIUM SERPL-MCNC: 2.2 MG/DL — SIGNIFICANT CHANGE UP (ref 1.8–2.4)
MCHC RBC-ENTMCNC: 24.6 PG — LOW (ref 27–31)
MCHC RBC-ENTMCNC: 33.1 G/DL — SIGNIFICANT CHANGE UP (ref 32–37)
MCV RBC AUTO: 74.1 FL — LOW (ref 80–94)
NRBC # BLD: 0 /100 WBCS — SIGNIFICANT CHANGE UP (ref 0–0)
PHOSPHATE SERPL-MCNC: 3.5 MG/DL — SIGNIFICANT CHANGE UP (ref 2.1–4.9)
PLATELET # BLD AUTO: 506 K/UL — HIGH (ref 130–400)
POTASSIUM SERPL-MCNC: 4.1 MMOL/L — SIGNIFICANT CHANGE UP (ref 3.5–5)
POTASSIUM SERPL-SCNC: 4.1 MMOL/L — SIGNIFICANT CHANGE UP (ref 3.5–5)
RBC # BLD: 4.64 M/UL — LOW (ref 4.7–6.1)
RBC # FLD: 15.1 % — HIGH (ref 11.5–14.5)
SODIUM SERPL-SCNC: 138 MMOL/L — SIGNIFICANT CHANGE UP (ref 135–146)
WBC # BLD: 13.37 K/UL — HIGH (ref 4.8–10.8)
WBC # FLD AUTO: 13.37 K/UL — HIGH (ref 4.8–10.8)

## 2020-02-13 PROCEDURE — 99232 SBSQ HOSP IP/OBS MODERATE 35: CPT

## 2020-02-13 RX ORDER — ELECTROLYTE SOLUTION,INJ
1 VIAL (ML) INTRAVENOUS
Refills: 0 | Status: DISCONTINUED | OUTPATIENT
Start: 2020-02-13 | End: 2020-02-14

## 2020-02-13 RX ORDER — I.V. FAT EMULSION 20 G/100ML
1.23 EMULSION INTRAVENOUS
Qty: 100.37 | Refills: 0 | Status: DISCONTINUED | OUTPATIENT
Start: 2020-02-13 | End: 2020-02-13

## 2020-02-13 RX ADMIN — Medication 1 SPRAY(S): at 21:46

## 2020-02-13 RX ADMIN — Medication 1 EACH: at 07:54

## 2020-02-13 RX ADMIN — I.V. FAT EMULSION 62.73 GM/KG/DAY: 20 EMULSION INTRAVENOUS at 23:31

## 2020-02-13 RX ADMIN — PANTOPRAZOLE SODIUM 40 MILLIGRAM(S): 20 TABLET, DELAYED RELEASE ORAL at 11:17

## 2020-02-13 RX ADMIN — Medication 70 EACH: at 23:30

## 2020-02-13 RX ADMIN — ENOXAPARIN SODIUM 40 MILLIGRAM(S): 100 INJECTION SUBCUTANEOUS at 11:17

## 2020-02-13 NOTE — CONSULT NOTE ADULT - SUBJECTIVE AND OBJECTIVE BOX
HPI:  Pt is a 48 y/o male who presented to ER with generalized abdominal pain, >lower abdomen, since last night.   Pt reports feeling fatigued and low grade temp 100.3, 2 days ago. He denies abdominal pain, N/V/D at that time. He was seen at  and discharged home on a Z pack and Motrin PRN, for suspected bronchitis. Pt reported anorexia yesterday and developed acute onset of abdominal pain after dinner, which progressed in severity overnight, which prompted visit to ER. Pt w temp 103.5 in ED, HR-106, BP stable.   Pt found to have acute diverticulitis w microperforation, some free air in upper abd/pelvis and questionable 2.5cm abscess between bladder and sigmoid colon.  Pt given IVF and Cipro/Flagyl in ER. (04 Feb 2020 09:48)      PAST MEDICAL & SURGICAL HISTORY:  No pertinent past medical history: diverticulitis (early 2000&#x27;s)  No significant past surgical history      FAMILY HISTORY:      SOCIAL HISTORY:     ICU Vital Signs Last 24 Hrs  T(C): 36.9 (13 Feb 2020 05:00), Max: 36.9 (13 Feb 2020 05:00)  T(F): 98.4 (13 Feb 2020 05:00), Max: 98.4 (13 Feb 2020 05:00)  HR: 81 (13 Feb 2020 05:00) (79 - 82)  BP: 148/94 (13 Feb 2020 05:00) (137/63 - 148/94)  BP(mean): --  ABP: --  ABP(mean): --  RR: 16 (13 Feb 2020 05:00) (16 - 16)  SpO2: --  Height (cm): 172.72 (02-07-20 @ 19:07)  Weight (kg): 81.6 (02-05-20 @ 18:15)  BMI (kg/m2): 27.4 (02-07-20 @ 19:07)  BSA (m2): 1.95 (02-07-20 @ 19:07)    I&O's Detail    12 Feb 2020 07:01  -  13 Feb 2020 07:00  --------------------------------------------------------  IN:  Total IN: 0 mL    OUT:    Colostomy: 100 mL    Voided: 800 mL  Total OUT: 900 mL    Total NET: -900 mL            PHYSICAL EXAM:  GENERAL: NAD, well-groomed, well-developed, Alert & Oriented X3  HEENT:  Atraumatic, Normocephalic, EOMI, PERRLA, conjunctiva and sclera clear, No tonsillar erythema, exudates, or enlargement; Moist mucous membranes, Good dentition, No lesions, Supple, No JVD, Normal thyroid  CHEST/LUNG: Clear to percussion bilaterally; No rales, rhonchi, wheezing, or rubs  HEART: Regular rate and rhythm; No murmurs, rubs, or gallops  ABDOMEN: Soft, Nontender, Nondistended; Bowel sounds present  EXTREMITIES:  2+ Peripheral Pulses, No clubbing, cyanosis, or edema  SKIN: No rashes or lesions  IV ACCESS:  FEEDING ACCESS:    MEDICATIONS  (STANDING):  acetaminophen   Tablet .. 650 milliGRAM(s) Oral every 6 hours  benzocaine 20% Spray 1 Spray(s) Topical three times a day  dextrose 5% + sodium chloride 0.9%. 1000 milliLiter(s) (125 mL/Hr) IV Continuous <Continuous>  enoxaparin Injectable 40 milliGRAM(s) SubCutaneous daily  fat emulsion (Plant Based) 20% Infusion 1.23 Gm/kG/Day (62.73 mL/Hr) IV Continuous <Continuous>  pantoprazole  Injectable 40 milliGRAM(s) IV Push daily  Parenteral Nutrition - Adult 1 Each (70 mL/Hr) TPN Continuous <Continuous>  Parenteral Nutrition - Adult 1 Each (70 mL/Hr) TPN Continuous <Continuous>    MEDICATIONS  (PRN):  ketorolac   Injectable 15 milliGRAM(s) IV Push every 6 hours PRN Moderate Pain (4 - 6)  ondansetron Injectable 4 milliGRAM(s) IV Push every 6 hours PRN Nausea and/or Vomiting  phenol 1.4% (CHLORASEPTIC) Oral Spray 2 Spray(s) Topical every 4 hours PRN sore throat  simethicone 80 milliGRAM(s) Chew every 8 hours PRN Gas  zolpidem 5 milliGRAM(s) Oral at bedtime PRN Insomnia    Allergies NKSA    LABS:    02-13    138  |  99  |  8<L>  ----------------------------<  106<H>  4.1   |  25  |  <0.5<L>    Ca    9.0      13 Feb 2020 09:51  Phos  3.5     02-13  Mg     2.2     02-13      RADIOLOGY:  < from: CT Abdomen and Pelvis No Cont (02.12.20 @ 10:47) >  IMPRESSION:   1.  Mild improvement in small bowel dilatation with oral contrast reaching the left colon.  2.  Previously described 2.0 x 1.4 cm rim-enhancing fluid collection in the pelvis is not clearly visualized on this exam, likely due to to lack of intravenous contrast administration.  3.  Stable left lower lobe patchy opacities, which may represent pneumonia. HPI:  Pt is a 50 y/o male who presented to ER with generalized abdominal pain, >lower abdomen, since last night.   Pt reports feeling fatigued and low grade temp 100.3, 2 days ago. He denies abdominal pain, N/V/D at that time. He was seen at  and discharged home on a Z pack and Motrin PRN, for suspected bronchitis. Pt reported anorexia yesterday and developed acute onset of abdominal pain after dinner, which progressed in severity overnight, which prompted visit to ER. Pt w temp 103.5 in ED, HR-106, BP stable.   Pt found to have acute diverticulitis w microperforation, some free air in upper abd/pelvis and questionable 2.5cm abscess between bladder and sigmoid colon.  Pt given IVF and Cipro/Flagyl in ER. (04 Feb 2020 09:48)      PAST MEDICAL & SURGICAL HISTORY:  No pertinent past medical history: diverticulitis (early 2000&#x27;s)  No significant past surgical history     ICU Vital Signs Last 24 Hrs  T(C): 36.9 (13 Feb 2020 05:00), Max: 36.9 (13 Feb 2020 05:00)  T(F): 98.4 (13 Feb 2020 05:00), Max: 98.4 (13 Feb 2020 05:00)  HR: 81 (13 Feb 2020 05:00) (79 - 82)  BP: 148/94 (13 Feb 2020 05:00) (137/63 - 148/94)  RR: 16 (13 Feb 2020 05:00) (16 - 16)  SpO2: --  Height (cm): 172.72 (02-07-20 @ 19:07)  Weight (kg): 81.6 (02-05-20 @ 18:15)  BMI (kg/m2): 27.4 (02-07-20 @ 19:07)  BSA (m2): 1.95 (02-07-20 @ 19:07)    I&O's Detail    12 Feb 2020 07:01  -  13 Feb 2020 07:00  --------------------------------------------------------  IN:  Total IN: 0 mL    OUT:    Colostomy: 100 mL    Voided: 800 mL  Total OUT: 900 mL    Total NET: -900 mL            PHYSICAL EXAM:  GENERAL: Alert & Oriented X3  +ngt in place  HEENT:  Moist mucous membranes,  ABDOMEN: Soft, mild tenderness Nondistended  EXTREMITIES:no edema  SKIN: No rashes or lesions  IV ACCESS: peripheral   FEEDING ACCESS:npo    MEDICATIONS  (STANDING):  acetaminophen   Tablet .. 650 milliGRAM(s) Oral every 6 hours  benzocaine 20% Spray 1 Spray(s) Topical three times a day  dextrose 5% + sodium chloride 0.9%. 1000 milliLiter(s) (125 mL/Hr) IV Continuous <Continuous>  enoxaparin Injectable 40 milliGRAM(s) SubCutaneous daily  fat emulsion (Plant Based) 20% Infusion 1.23 Gm/kG/Day (62.73 mL/Hr) IV Continuous <Continuous>  pantoprazole  Injectable 40 milliGRAM(s) IV Push daily  Parenteral Nutrition - Adult 1 Each (70 mL/Hr) TPN Continuous <Continuous>  Parenteral Nutrition - Adult 1 Each (70 mL/Hr) TPN Continuous <Continuous>    MEDICATIONS  (PRN):  ketorolac   Injectable 15 milliGRAM(s) IV Push every 6 hours PRN Moderate Pain (4 - 6)  ondansetron Injectable 4 milliGRAM(s) IV Push every 6 hours PRN Nausea and/or Vomiting  phenol 1.4% (CHLORASEPTIC) Oral Spray 2 Spray(s) Topical every 4 hours PRN sore throat  simethicone 80 milliGRAM(s) Chew every 8 hours PRN Gas  zolpidem 5 milliGRAM(s) Oral at bedtime PRN Insomnia    Allergies NKSA    LABS:    02-13    138  |  99  |  8<L>  ----------------------------<  106<H>  4.1   |  25  |  <0.5<L>    Ca    9.0      13 Feb 2020 09:51  Phos  3.5     02-13  Mg     2.2     02-13      RADIOLOGY:  < from: CT Abdomen and Pelvis No Cont (02.12.20 @ 10:47) >  IMPRESSION:   1.  Mild improvement in small bowel dilatation with oral contrast reaching the left colon.  2.  Previously described 2.0 x 1.4 cm rim-enhancing fluid collection in the pelvis is not clearly visualized on this exam, likely due to to lack of intravenous contrast administration.  3.  Stable left lower lobe patchy opacities, which may represent pneumonia. HPI:  Pt is a 48 y/o male who presented to ER with generalized abdominal pain, >lower abdomen, since last night.   Pt reports feeling fatigued and low grade temp 100.3, 2 days ago. He denies abdominal pain, N/V/D at that time. He was seen at  and discharged home on a Z pack and Motrin PRN, for suspected bronchitis. Pt reported anorexia yesterday and developed acute onset of abdominal pain after dinner, which progressed in severity overnight, which prompted visit to ER. Pt w temp 103.5 in ED, HR-106, BP stable.   Pt found to have acute diverticulitis w microperforation, some free air in upper abd/pelvis and questionable 2.5cm abscess between bladder and sigmoid colon.  Pt given IVF and Cipro/Flagyl in ER. (04 Feb 2020 09:48)      PAST MEDICAL & SURGICAL HISTORY:  No pertinent past medical history: diverticulitis (early 2000&#x27;s)  No significant past surgical history     ICU Vital Signs Last 24 Hrs  T(C): 36.9 (13 Feb 2020 05:00), Max: 36.9 (13 Feb 2020 05:00)  T(F): 98.4 (13 Feb 2020 05:00), Max: 98.4 (13 Feb 2020 05:00)  HR: 81 (13 Feb 2020 05:00) (79 - 82)  BP: 148/94 (13 Feb 2020 05:00) (137/63 - 148/94)  RR: 16 (13 Feb 2020 05:00) (16 - 16)  SpO2: --  Height (cm): 172.72 (02-07-20 @ 19:07)  Weight (kg): 81.6 (02-05-20 @ 18:15)  BMI (kg/m2): 27.4 (02-07-20 @ 19:07)  BSA (m2): 1.95 (02-07-20 @ 19:07)    I&O's Detail    12 Feb 2020 07:01  -  13 Feb 2020 07:00  --------------------------------------------------------  IN:  Total IN: 0 mL    OUT:    Colostomy: 100 mL    Voided: 800 mL  Total OUT: 900 mL    Total NET: -900 mL            PHYSICAL EXAM:  GENERAL: Alert & Oriented X3  +ngt in place  HEENT:  Moist mucous membranes,  ABDOMEN: Soft, mild tenderness Nondistended +abdominal binder in place +colostomy in place   EXTREMITIES:no edema  SKIN: No rashes or lesions  IV ACCESS: peripheral   FEEDING ACCESS: npo    MEDICATIONS  (STANDING):  acetaminophen   Tablet .. 650 milliGRAM(s) Oral every 6 hours  benzocaine 20% Spray 1 Spray(s) Topical three times a day  dextrose 5% + sodium chloride 0.9%. 1000 milliLiter(s) (125 mL/Hr) IV Continuous <Continuous>  enoxaparin Injectable 40 milliGRAM(s) SubCutaneous daily  fat emulsion (Plant Based) 20% Infusion 1.23 Gm/kG/Day (62.73 mL/Hr) IV Continuous <Continuous>  pantoprazole  Injectable 40 milliGRAM(s) IV Push daily  Parenteral Nutrition - Adult 1 Each (70 mL/Hr) TPN Continuous <Continuous>  Parenteral Nutrition - Adult 1 Each (70 mL/Hr) TPN Continuous <Continuous>    MEDICATIONS  (PRN):  ketorolac   Injectable 15 milliGRAM(s) IV Push every 6 hours PRN Moderate Pain (4 - 6)  ondansetron Injectable 4 milliGRAM(s) IV Push every 6 hours PRN Nausea and/or Vomiting  phenol 1.4% (CHLORASEPTIC) Oral Spray 2 Spray(s) Topical every 4 hours PRN sore throat  simethicone 80 milliGRAM(s) Chew every 8 hours PRN Gas  zolpidem 5 milliGRAM(s) Oral at bedtime PRN Insomnia    Allergies NKSA    LABS:    02-13    138  |  99  |  8<L>  ----------------------------<  106<H>  4.1   |  25  |  <0.5<L>    Ca    9.0      13 Feb 2020 09:51  Phos  3.5     02-13  Mg     2.2     02-13      RADIOLOGY:  < from: CT Abdomen and Pelvis No Cont (02.12.20 @ 10:47) >  IMPRESSION:   1.  Mild improvement in small bowel dilatation with oral contrast reaching the left colon.  2.  Previously described 2.0 x 1.4 cm rim-enhancing fluid collection in the pelvis is not clearly visualized on this exam, likely due to to lack of intravenous contrast administration.  3.  Stable left lower lobe patchy opacities, which may represent pneumonia.

## 2020-02-13 NOTE — PROGRESS NOTE ADULT - SUBJECTIVE AND OBJECTIVE BOX
HPI:  Pt denies abd pain n/v, reports gas and stool in ostomy. States he feels anxious with NGT in place. +OOB ambuulating + incentive spir      PAST MEDICAL & SURGICAL HISTORY:  No pertinent past medical history: diverticulitis (early 2000&#x27;s)  No significant past surgical history      Allergies    No Known Allergies      MEDICATIONS  (STANDING):  acetaminophen   Tablet .. 650 milliGRAM(s) Oral every 6 hours  benzocaine 20% Spray 1 Spray(s) Topical three times a day  ciprofloxacin   IVPB 400 milliGRAM(s) IV Intermittent every 12 hours  dextrose 5% + sodium chloride 0.9%. 1000 milliLiter(s) (125 mL/Hr) IV Continuous <Continuous>  enoxaparin Injectable 40 milliGRAM(s) SubCutaneous daily  metroNIDAZOLE  IVPB 500 milliGRAM(s) IV Intermittent every 8 hours  pantoprazole  Injectable 40 milliGRAM(s) IV Push daily    MEDICATIONS  (PRN):  ketorolac   Injectable 15 milliGRAM(s) IV Push every 6 hours PRN Moderate Pain (4 - 6)  ondansetron Injectable 4 milliGRAM(s) IV Push every 6 hours PRN Nausea and/or Vomiting  phenol 1.4% (CHLORASEPTIC) Oral Spray 2 Spray(s) Topical every 4 hours PRN sore throat  simethicone 80 milliGRAM(s) Chew every 8 hours PRN Gas  zolpidem 5 milliGRAM(s) Oral at bedtime PRN Insomnia      ROS:  General:	no fever, weight loss,  chills  Respiratory and Thorax: no cough, wheeze,  sob  Cardiovascular:	no chest pain, palpitations, dizziness  Gastrointestinal:	no nausea, vomiting, diarrhea, abd pain  Genitourinary:	no dysuria, hematuria          Vital Signs Last 24 Hrs  T(C): 36.9 (13 Feb 2020 05:00), Max: 36.9 (13 Feb 2020 05:00)  T(F): 98.4 (13 Feb 2020 05:00), Max: 98.4 (13 Feb 2020 05:00)  HR: 81 (13 Feb 2020 05:00) (79 - 82)  BP: 148/94 (13 Feb 2020 05:00) (137/63 - 148/94)  BP(mean): --  RR: 16 (13 Feb 2020 05:00) (16 - 16)  SpO2: --      PHYSICAL EXAM:      Constitutional: A&Ox4  Respiratory: cta b/l  Cardiovascular: s1 s2 rrr  Gastrointestinal: soft nt  nd + bs no rebound or guarding +gas and some formed stool in ostomy bag   Genitourinary: no cva tenderness  Extremities: normal rom, no edema, calf tenderness                                        11.4   13.37 )-----------( 506      ( 13 Feb 2020 09:51 )             34.4       02-12    138  |  101  |  8<L>  ----------------------------<  113<H>  4.2   |  22  |  0.7    Ca    8.5      12 Feb 2020 08:54  Phos  3.6     02-12  Mg     2.1     02-12            Radiology:   < from: CT Abdomen and Pelvis No Cont (02.12.20 @ 10:47) >    IMPRESSION:     1.  Mild improvement in small bowel dilatation with oral contrast reaching the left colon.    2.  Previously described 2.0 x 1.4 cm rim-enhancing fluid collection in the pelvis is not clearly visualized on this exam, likely due to to lack of intravenous contrast administration.  3.  Stable left lower lobe patchy opacities, which may represent pneumonia.                ROCK LUBIN M.D., RESIDENT RADIOLOGIST  This document has been electronically signed.  ARNULFO DELGADO M.D., ATTENDING RADIOLOGIST  This document has been electronically signed. Feb 12 2020 11:48AM                < end of copied text >

## 2020-02-13 NOTE — CONSULT NOTE ADULT - ASSESSMENT
1. Elevated BP without diagnosis of HTN  - Multifactorial, suspect due to pain   - Adequate pain control  - Low sodium diet   - Start Lisinopril 5 mg if remains elevated tomorrow  - Patient educated about his condition    Thank you for consulting me.     Dr Kimball  [id number]
ASSESSMENT  Pt is a 50yo M with perforated diverticulitis s/p ex lap ,colon resection, and colostomy  with resolving ileus  PLAN  check triglyceride level  PPN ORDER 2/12   check bmp/phos/mg and correct lytes

## 2020-02-13 NOTE — PROGRESS NOTE ADULT - ASSESSMENT
Pt is a 48yo M with perforated diverticulitis s/p ex lap ,colon resection, and colostomy POD#8 with resolving ileus       -c/w npo, ivf,   - NGT clamped 0630 - check for residual at 1030 - if <100cc can DC NGT and start clears  - cont OOB, IS  -c/w iv abx  -serial lab and exam    All above D/W DR Magdaleno, pt seen and examined by DR Hawkins

## 2020-02-13 NOTE — CONSULT NOTE ADULT - ATTENDING COMMENTS
GI Attending Covering for Nutrition: I agree with the above plan and recommendations from a Nutritional Service Perspective

## 2020-02-14 LAB
ANION GAP SERPL CALC-SCNC: 12 MMOL/L — SIGNIFICANT CHANGE UP (ref 7–14)
BASOPHILS # BLD AUTO: 0.09 K/UL — SIGNIFICANT CHANGE UP (ref 0–0.2)
BASOPHILS NFR BLD AUTO: 0.7 % — SIGNIFICANT CHANGE UP (ref 0–1)
BUN SERPL-MCNC: 11 MG/DL — SIGNIFICANT CHANGE UP (ref 10–20)
CALCIUM SERPL-MCNC: 8.8 MG/DL — SIGNIFICANT CHANGE UP (ref 8.5–10.1)
CHLORIDE SERPL-SCNC: 102 MMOL/L — SIGNIFICANT CHANGE UP (ref 98–110)
CO2 SERPL-SCNC: 23 MMOL/L — SIGNIFICANT CHANGE UP (ref 17–32)
CREAT SERPL-MCNC: 0.6 MG/DL — LOW (ref 0.7–1.5)
DACRYOCYTES BLD QL SMEAR: SLIGHT — SIGNIFICANT CHANGE UP
EOSINOPHIL # BLD AUTO: 0.32 K/UL — SIGNIFICANT CHANGE UP (ref 0–0.7)
EOSINOPHIL NFR BLD AUTO: 2.4 % — SIGNIFICANT CHANGE UP (ref 0–8)
GLUCOSE SERPL-MCNC: 103 MG/DL — HIGH (ref 70–99)
HCT VFR BLD CALC: 33.7 % — LOW (ref 42–52)
HGB BLD-MCNC: 11.2 G/DL — LOW (ref 14–18)
IMM GRANULOCYTES NFR BLD AUTO: 1.7 % — HIGH (ref 0.1–0.3)
LYMPHOCYTES # BLD AUTO: 17.8 % — LOW (ref 20.5–51.1)
LYMPHOCYTES # BLD AUTO: 2.34 K/UL — SIGNIFICANT CHANGE UP (ref 1.2–3.4)
MAGNESIUM SERPL-MCNC: 2.4 MG/DL — SIGNIFICANT CHANGE UP (ref 1.8–2.4)
MCHC RBC-ENTMCNC: 24.6 PG — LOW (ref 27–31)
MCHC RBC-ENTMCNC: 33.2 G/DL — SIGNIFICANT CHANGE UP (ref 32–37)
MCV RBC AUTO: 73.9 FL — LOW (ref 80–94)
MICROCYTES BLD QL: SLIGHT — SIGNIFICANT CHANGE UP
MONOCYTES # BLD AUTO: 1.02 K/UL — HIGH (ref 0.1–0.6)
MONOCYTES NFR BLD AUTO: 7.8 % — SIGNIFICANT CHANGE UP (ref 1.7–9.3)
NEUTROPHILS # BLD AUTO: 9.13 K/UL — HIGH (ref 1.4–6.5)
NEUTROPHILS NFR BLD AUTO: 69.6 % — SIGNIFICANT CHANGE UP (ref 42.2–75.2)
NRBC # BLD: 0 /100 WBCS — SIGNIFICANT CHANGE UP (ref 0–0)
PHOSPHATE SERPL-MCNC: 4.3 MG/DL — SIGNIFICANT CHANGE UP (ref 2.1–4.9)
PLAT MORPH BLD: NORMAL — SIGNIFICANT CHANGE UP
PLATELET # BLD AUTO: 474 K/UL — HIGH (ref 130–400)
POTASSIUM SERPL-MCNC: 4.2 MMOL/L — SIGNIFICANT CHANGE UP (ref 3.5–5)
POTASSIUM SERPL-SCNC: 4.2 MMOL/L — SIGNIFICANT CHANGE UP (ref 3.5–5)
RBC # BLD: 4.56 M/UL — LOW (ref 4.7–6.1)
RBC # FLD: 15.2 % — HIGH (ref 11.5–14.5)
RBC BLD AUTO: ABNORMAL
SODIUM SERPL-SCNC: 137 MMOL/L — SIGNIFICANT CHANGE UP (ref 135–146)
WBC # BLD: 13.12 K/UL — HIGH (ref 4.8–10.8)
WBC # FLD AUTO: 13.12 K/UL — HIGH (ref 4.8–10.8)

## 2020-02-14 RX ORDER — I.V. FAT EMULSION 20 G/100ML
1.23 EMULSION INTRAVENOUS
Qty: 100.37 | Refills: 0 | Status: DISCONTINUED | OUTPATIENT
Start: 2020-02-15 | End: 2020-02-15

## 2020-02-14 RX ORDER — ELECTROLYTE SOLUTION,INJ
1 VIAL (ML) INTRAVENOUS
Refills: 0 | Status: DISCONTINUED | OUTPATIENT
Start: 2020-02-14 | End: 2020-02-14

## 2020-02-14 RX ORDER — I.V. FAT EMULSION 20 G/100ML
1.23 EMULSION INTRAVENOUS
Qty: 100.37 | Refills: 0 | Status: DISCONTINUED | OUTPATIENT
Start: 2020-02-14 | End: 2020-02-14

## 2020-02-14 RX ORDER — ELECTROLYTE SOLUTION,INJ
1 VIAL (ML) INTRAVENOUS
Refills: 0 | Status: DISCONTINUED | OUTPATIENT
Start: 2020-02-15 | End: 2020-02-15

## 2020-02-14 RX ADMIN — PANTOPRAZOLE SODIUM 40 MILLIGRAM(S): 20 TABLET, DELAYED RELEASE ORAL at 11:26

## 2020-02-14 RX ADMIN — Medication 70 EACH: at 07:47

## 2020-02-14 RX ADMIN — I.V. FAT EMULSION 62.73 GM/KG/DAY: 20 EMULSION INTRAVENOUS at 21:14

## 2020-02-14 RX ADMIN — Medication 70 EACH: at 21:14

## 2020-02-14 RX ADMIN — Medication 1 SPRAY(S): at 05:28

## 2020-02-14 NOTE — PROGRESS NOTE ADULT - SUBJECTIVE AND OBJECTIVE BOX
Patient is a 49y old  Male who presents with a chief complaint of Perforated Diverticulitis (14 Feb 2020 09:47)      Last 24 hrs:         ICU Vital Signs Last 24 Hrs  T(C): 36.7 (14 Feb 2020 05:23), Max: 37.5 (13 Feb 2020 21:00)  T(F): 98.1 (14 Feb 2020 05:23), Max: 99.5 (13 Feb 2020 21:00)  HR: 94 (14 Feb 2020 05:23) (89 - 94)  BP: 131/89 (14 Feb 2020 05:23) (117/77 - 131/89)  BP(mean): --  ABP: --  ABP(mean): --  RR: 16 (14 Feb 2020 05:23) (16 - 16)  SpO2: --      Drug Dosing Weight  Height (cm): 172.72 (07 Feb 2020 19:07)  Weight (kg): 81.6 (05 Feb 2020 18:15)  BMI (kg/m2): 27.4 (07 Feb 2020 19:07)  BSA (m2): 1.95 (07 Feb 2020 19:07)    I&O's Detail    13 Feb 2020 07:01  -  14 Feb 2020 07:00  --------------------------------------------------------  IN:  Total IN: 0 mL    OUT:    Nasoenteral Tube: 300 mL    Voided: 1450 mL  Total OUT: 1750 mL    Total NET: -1750 mL      14 Feb 2020 07:01  -  14 Feb 2020 10:32  --------------------------------------------------------  IN:  Total IN: 0 mL    OUT:    Voided: 550 mL  Total OUT: 550 mL    Total NET: -550 mL           PHYSICAL EXAM:    Constitutional:    ENMT:    Neck:    Breasts:    Respiratory:    Gastrointestinal:    Genitourinary:    Rectal:    Extremities    Neurological:    Skin:    Lymph Nodes:    IV access:    MEDICATIONS  (STANDING):  acetaminophen   Tablet .. 650 milliGRAM(s) Oral every 6 hours  benzocaine 20% Spray 1 Spray(s) Topical three times a day  enoxaparin Injectable 40 milliGRAM(s) SubCutaneous daily  pantoprazole  Injectable 40 milliGRAM(s) IV Push daily  Parenteral Nutrition - Adult 1 Each (70 mL/Hr) TPN Continuous <Continuous>      Diet, Clear Liquid (02-14-20 @ 09:52)      LABS  02-14    137  |  102  |  11  ----------------------------<  103<H>  4.2   |  23  |  0.6<L>    Ca    8.8      14 Feb 2020 07:47  Phos  4.3     02-14  Mg     2.4     02-14                            11.2   13.12 )-----------( 474      ( 14 Feb 2020 07:47 )             33.7     CAPILLARY BLOOD GLUCOSE           RADIOLOGY STUDIES Patient is a 49y old  Male who presents with a chief complaint of Perforated Diverticulitis (14 Feb 2020 09:47)  pt seen and evaluated this morning   ambulating   on PPN   to start po diet today but nothing yet  ICU Vital Signs Last 24 Hrs  T(C): 36.7 (14 Feb 2020 05:23), Max: 37.5 (13 Feb 2020 21:00)  T(F): 98.1 (14 Feb 2020 05:23), Max: 99.5 (13 Feb 2020 21:00)  HR: 94 (14 Feb 2020 05:23) (89 - 94)  BP: 131/89 (14 Feb 2020 05:23) (117/77 - 131/89)  RR: 16 (14 Feb 2020 05:23) (16 - 16)  Drug Dosing Weight  Height (cm): 172.72 (07 Feb 2020 19:07)  Weight (kg): 81.6 (05 Feb 2020 18:15)  BMI (kg/m2): 27.4 (07 Feb 2020 19:07)  BSA (m2): 1.95 (07 Feb 2020 19:07)    I&O's Detail    13 Feb 2020 07:01  -  14 Feb 2020 07:00  --------------------------------------------------------  IN:  Total IN: 0 mL    OUT:    Nasoenteral Tube: 300 mL    Voided: 1450 mL  Total OUT: 1750 mL    Total NET: -1750 mL      14 Feb 2020 07:01  -  14 Feb 2020 10:32  --------------------------------------------------------  IN:  Total IN: 0 mL    OUT:    Voided: 550 mL  Total OUT: 550 mL    Total NET: -550 mL       PHYSICAL EXAM:  Constitutional:A+OX#  ngt is out  gastrointestinal mildly distended, abdominal binder noted colostomy bag with liquid output  IV access: peripheral iv line    MEDICATIONS  (STANDING):  acetaminophen   Tablet .. 650 milliGRAM(s) Oral every 6 hours  benzocaine 20% Spray 1 Spray(s) Topical three times a day  enoxaparin Injectable 40 milliGRAM(s) SubCutaneous daily  pantoprazole  Injectable 40 milliGRAM(s) IV Push daily  Parenteral Nutrition - Adult 1 Each (70 mL/Hr) TPN Continuous <Continuous>      Diet, Clear Liquid (02-14-20 @ 09:52)      LABS  02-14    137  |  102  |  11  ----------------------------<  103<H>  4.2   |  23  |  0.6<L>    Ca    8.8      14 Feb 2020 07:47  Phos  4.3     02-14  Mg     2.4     02-14                        11.2   13.12 )-----------( 474      ( 14 Feb 2020 07:47 )             33.7

## 2020-02-14 NOTE — PROGRESS NOTE ADULT - SUBJECTIVE AND OBJECTIVE BOX
Pt denies abd pain n/v, reports gas and stool in ostomy. NGT removed yesterday   Sucking on hard candy   +OOB ambulating + incentive spir      PAST MEDICAL & SURGICAL HISTORY:  No pertinent past medical history: diverticulitis (early 2000&#x27;s)  No significant past surgical history      Allergies    No Known Allergies      MEDICATIONS  (STANDING):  acetaminophen   Tablet .. 650 milliGRAM(s) Oral every 6 hours  benzocaine 20% Spray 1 Spray(s) Topical three times a day  ciprofloxacin   IVPB 400 milliGRAM(s) IV Intermittent every 12 hours  dextrose 5% + sodium chloride 0.9%. 1000 milliLiter(s) (125 mL/Hr) IV Continuous <Continuous>  enoxaparin Injectable 40 milliGRAM(s) SubCutaneous daily  metroNIDAZOLE  IVPB 500 milliGRAM(s) IV Intermittent every 8 hours  pantoprazole  Injectable 40 milliGRAM(s) IV Push daily    MEDICATIONS  (PRN):  ketorolac   Injectable 15 milliGRAM(s) IV Push every 6 hours PRN Moderate Pain (4 - 6)  ondansetron Injectable 4 milliGRAM(s) IV Push every 6 hours PRN Nausea and/or Vomiting  phenol 1.4% (CHLORASEPTIC) Oral Spray 2 Spray(s) Topical every 4 hours PRN sore throat  simethicone 80 milliGRAM(s) Chew every 8 hours PRN Gas  zolpidem 5 milliGRAM(s) Oral at bedtime PRN Insomnia      ROS:  General:	no fever, weight loss,  chills  Respiratory and Thorax: no cough, wheeze,  sob  Cardiovascular:	no chest pain, palpitations, dizziness  Gastrointestinal:	no nausea, vomiting, diarrhea, abd pain  Genitourinary:	no dysuria, hematuria        Vital Signs Last 24 Hrs  T(C): 36.7 (14 Feb 2020 05:23), Max: 37.5 (13 Feb 2020 21:00)  T(F): 98.1 (14 Feb 2020 05:23), Max: 99.5 (13 Feb 2020 21:00)  HR: 94 (14 Feb 2020 05:23) (89 - 94)  BP: 131/89 (14 Feb 2020 05:23) (117/77 - 131/89)  RR: 16 (14 Feb 2020 05:23) (16 - 16)        PHYSICAL EXAM:  constitutional: A&Ox4  Respiratory: cta b/l  Cardiovascular: s1 s2 rrr  Gastrointestinal: soft nt  nd + bs no rebound or guarding +gas and some formed stool in ostomy bag   Genitourinary: no cva tenderness  Extremities: normal rom, no edema, calf tenderness                  02-13    138  |  99  |  8<L>  ----------------------------<  106<H>  4.1   |  25  |  <0.5<L>    Ca    9.0      13 Feb 2020 09:51  Phos  3.5     02-13  Mg     2.2     02-13                              11.4   13.37 )-----------( 506      ( 13 Feb 2020 09:51 )             34.4     CAPILLARY BLOOD GLUCOSE                      Radiology:   < from: CT Abdomen and Pelvis No Cont (02.12.20 @ 10:47) >    IMPRESSION:     1.  Mild improvement in small bowel dilatation with oral contrast reaching the left colon.    2.  Previously described 2.0 x 1.4 cm rim-enhancing fluid collection in the pelvis is not clearly visualized on this exam, likely due to to lack of intravenous contrast administration.  3.  Stable left lower lobe patchy opacities, which may represent pneumonia.                ROCK LUBIN M.D., RESIDENT RADIOLOGIST  This document has been electronically signed.  ARNULFO DELGADO M.D., ATTENDING RADIOLOGIST  This document has been electronically signed. Feb 12 2020 11:48AM                < end of copied text >

## 2020-02-14 NOTE — PROGRESS NOTE ADULT - ASSESSMENT
ASSESSMENT/PLAN  Pt is a 50yo M with perforated diverticulitis s/p ex lap ,colon resection, and colostomy  with resolving ileus  PLAN  PPN ORDER for tonight and Saturday  no need to taper PPN can be d/drew when bag ends    spoke with nursing team  check bmp/phos/mg and correct lytes

## 2020-02-14 NOTE — PROGRESS NOTE ADULT - ASSESSMENT
Pt is a 50yo M with perforated diverticulitis s/p ex lap ,colon resection, and colostomy POD#9 with resolving ileus    - start clears   - cont OOB, IS  -c/w iv abx  -serial lab and exam    All above D/W DR Magdaleno,

## 2020-02-15 ENCOUNTER — TRANSCRIPTION ENCOUNTER (OUTPATIENT)
Age: 50
End: 2020-02-15

## 2020-02-15 VITALS
RESPIRATION RATE: 16 BRPM | TEMPERATURE: 97 F | DIASTOLIC BLOOD PRESSURE: 74 MMHG | HEART RATE: 72 BPM | SYSTOLIC BLOOD PRESSURE: 116 MMHG

## 2020-02-15 LAB
ALBUMIN SERPL ELPH-MCNC: 3.6 G/DL — SIGNIFICANT CHANGE UP (ref 3.5–5.2)
ALP SERPL-CCNC: 135 U/L — HIGH (ref 30–115)
ALT FLD-CCNC: 66 U/L — HIGH (ref 0–41)
ANION GAP SERPL CALC-SCNC: 12 MMOL/L — SIGNIFICANT CHANGE UP (ref 7–14)
ANION GAP SERPL CALC-SCNC: 15 MMOL/L — HIGH (ref 7–14)
AST SERPL-CCNC: <5 U/L — SIGNIFICANT CHANGE UP (ref 0–41)
BASOPHILS # BLD AUTO: 0.07 K/UL — SIGNIFICANT CHANGE UP (ref 0–0.2)
BASOPHILS NFR BLD AUTO: 0.6 % — SIGNIFICANT CHANGE UP (ref 0–1)
BILIRUB SERPL-MCNC: 0.3 MG/DL — SIGNIFICANT CHANGE UP (ref 0.2–1.2)
BUN SERPL-MCNC: 9 MG/DL — LOW (ref 10–20)
BUN SERPL-MCNC: 9 MG/DL — LOW (ref 10–20)
CALCIUM SERPL-MCNC: 8.9 MG/DL — SIGNIFICANT CHANGE UP (ref 8.5–10.1)
CALCIUM SERPL-MCNC: 9 MG/DL — SIGNIFICANT CHANGE UP (ref 8.5–10.1)
CHLORIDE SERPL-SCNC: 104 MMOL/L — SIGNIFICANT CHANGE UP (ref 98–110)
CHLORIDE SERPL-SCNC: 104 MMOL/L — SIGNIFICANT CHANGE UP (ref 98–110)
CO2 SERPL-SCNC: 19 MMOL/L — SIGNIFICANT CHANGE UP (ref 17–32)
CO2 SERPL-SCNC: 21 MMOL/L — SIGNIFICANT CHANGE UP (ref 17–32)
CREAT SERPL-MCNC: 0.5 MG/DL — LOW (ref 0.7–1.5)
CREAT SERPL-MCNC: 0.5 MG/DL — LOW (ref 0.7–1.5)
EOSINOPHIL # BLD AUTO: 0.28 K/UL — SIGNIFICANT CHANGE UP (ref 0–0.7)
EOSINOPHIL NFR BLD AUTO: 2.4 % — SIGNIFICANT CHANGE UP (ref 0–8)
GLUCOSE SERPL-MCNC: 104 MG/DL — HIGH (ref 70–99)
GLUCOSE SERPL-MCNC: 105 MG/DL — HIGH (ref 70–99)
HCT VFR BLD CALC: 36.8 % — LOW (ref 42–52)
HGB BLD-MCNC: 12 G/DL — LOW (ref 14–18)
IMM GRANULOCYTES NFR BLD AUTO: 1.3 % — HIGH (ref 0.1–0.3)
LYMPHOCYTES # BLD AUTO: 18.6 % — LOW (ref 20.5–51.1)
LYMPHOCYTES # BLD AUTO: 2.15 K/UL — SIGNIFICANT CHANGE UP (ref 1.2–3.4)
MAGNESIUM SERPL-MCNC: 2.3 MG/DL — SIGNIFICANT CHANGE UP (ref 1.8–2.4)
MCHC RBC-ENTMCNC: 24.4 PG — LOW (ref 27–31)
MCHC RBC-ENTMCNC: 32.6 G/DL — SIGNIFICANT CHANGE UP (ref 32–37)
MCV RBC AUTO: 74.9 FL — LOW (ref 80–94)
MONOCYTES # BLD AUTO: 0.75 K/UL — HIGH (ref 0.1–0.6)
MONOCYTES NFR BLD AUTO: 6.5 % — SIGNIFICANT CHANGE UP (ref 1.7–9.3)
NEUTROPHILS # BLD AUTO: 8.13 K/UL — HIGH (ref 1.4–6.5)
NEUTROPHILS NFR BLD AUTO: 70.6 % — SIGNIFICANT CHANGE UP (ref 42.2–75.2)
NRBC # BLD: 0 /100 WBCS — SIGNIFICANT CHANGE UP (ref 0–0)
PHOSPHATE SERPL-MCNC: 4.2 MG/DL — SIGNIFICANT CHANGE UP (ref 2.1–4.9)
PLATELET # BLD AUTO: 488 K/UL — HIGH (ref 130–400)
POTASSIUM SERPL-MCNC: 4.5 MMOL/L — SIGNIFICANT CHANGE UP (ref 3.5–5)
POTASSIUM SERPL-MCNC: 4.6 MMOL/L — SIGNIFICANT CHANGE UP (ref 3.5–5)
POTASSIUM SERPL-SCNC: 4.5 MMOL/L — SIGNIFICANT CHANGE UP (ref 3.5–5)
POTASSIUM SERPL-SCNC: 4.6 MMOL/L — SIGNIFICANT CHANGE UP (ref 3.5–5)
PROT SERPL-MCNC: 6.3 G/DL — SIGNIFICANT CHANGE UP (ref 6–8)
RBC # BLD: 4.91 M/UL — SIGNIFICANT CHANGE UP (ref 4.7–6.1)
RBC # FLD: 15.4 % — HIGH (ref 11.5–14.5)
SODIUM SERPL-SCNC: 137 MMOL/L — SIGNIFICANT CHANGE UP (ref 135–146)
SODIUM SERPL-SCNC: 138 MMOL/L — SIGNIFICANT CHANGE UP (ref 135–146)
WBC # BLD: 11.53 K/UL — HIGH (ref 4.8–10.8)
WBC # FLD AUTO: 11.53 K/UL — HIGH (ref 4.8–10.8)

## 2020-02-15 PROCEDURE — 99232 SBSQ HOSP IP/OBS MODERATE 35: CPT | Mod: 25

## 2020-02-15 RX ORDER — AZITHROMYCIN 500 MG/1
0 TABLET, FILM COATED ORAL
Qty: 0 | Refills: 0 | DISCHARGE

## 2020-02-15 RX ADMIN — PANTOPRAZOLE SODIUM 40 MILLIGRAM(S): 20 TABLET, DELAYED RELEASE ORAL at 11:31

## 2020-02-15 NOTE — DISCHARGE NOTE PROVIDER - NSDCCPCAREPLAN_GEN_ALL_CORE_FT
PRINCIPAL DISCHARGE DIAGNOSIS  Diagnosis: Perforated diverticulum  Assessment and Plan of Treatment: follow up in office in 1 week   continue with healthy diet   no heavy lifting until cleared   dressing changed daily

## 2020-02-15 NOTE — DISCHARGE NOTE NURSING/CASE MANAGEMENT/SOCIAL WORK - PATIENT PORTAL LINK FT
You can access the FollowMyHealth Patient Portal offered by [facility] by registering at the following website: [email or web address]. By joining [employer]’s FollowMyHealth portal, you will also be able to view your health information using other applications (apps) compatible with our system.

## 2020-02-15 NOTE — DISCHARGE NOTE PROVIDER - HOSPITAL COURSE
Pt is a 48 y/o male who presented to ER with generalized abdominal pain, >lower abdomen, since last night.     Pt reports feeling fatigued and low grade temp 100.3, 2 days ago. He denies abdominal pain, N/V/D at that time. He was seen at  and discharged home on a Z pack and Motrin PRN, for suspected bronchitis. Pt reported anorexia yesterday and developed acute onset of abdominal pain after dinner, which progressed in severity overnight, which prompted visit to ER. Pt w temp 103.5 in ED, HR-106, BP stable.     Pt found to have acute diverticulitis w microperforation, some free air in upper abd/pelvis and questionable 2.5cm abscess between bladder and sigmoid colon.        taken to the OR day 1 of hospitalization         Closure of enterotomy of small bowel 05-Feb-2020     Open sigmoidectomy 05-Feb-2020 21:22:18     Creation of end colostomy 05-Feb-2020 21:22:08    Exploratory laparotomy 05-Feb-2020 21:21:49      Cruzito's procedure 05-Feb-2020 21:21:39         PPN started and awaited colostomy to function and then advance diet as tolerated     finished iv abx course         ROMAIN SMITH is a 49yMale HD11  from Closure of enterotomy of small bowel    Open sigmoidectomy    Creation of end colostomy    Exploratory laparotomy    Cruzito's procedure    He is currently tolerating clears, his pain is controlled, he is ambulating without issue, voiding spontaneously, passing gas and having bowel movements in his ostomy.        regular diet tolerated     - discontinue PPN    - VNS for midline wound: wet-to-dry dressing daily, patient should remove dressing and shower before nurse arrives    - continue low residue diet for the next couple of weeks    - d/c today, f/u with Dr. Magdaleno o/p    - d/w patient, surgical team, Dr. Magdaleno, and Dr. Currie

## 2020-02-15 NOTE — PROGRESS NOTE ADULT - ASSESSMENT
A/P:  ROMAIN SMITH is a 49yMale HD11  from Closure of enterotomy of small bowel  Open sigmoidectomy  Creation of end colostomy  Exploratory laparotomy  Cruzito's procedure  He is currently tolerating clears, his pain is controlled, he is ambulating without issue, voiding spontaneously, passing gas and having bowel movements in his ostomy.    Plan:   - advance to regular diet this AM  - discontinue PPN  - VNS for midline wound: wet-to-dry dressing daily, patient should remove dressing and shower before nurse arrives  - continue low residue diet for the next couple of weeks  - d/c today, f/u with Dr. Magdaleno o/p  - d/w patient, surgical team, Dr. Magdaleno, and Dr. Currie

## 2020-02-15 NOTE — PROGRESS NOTE ADULT - ATTENDING COMMENTS
Pt seen and examined @ bedside, 1730 hrs:  alert/stable/afebrile.  Feels much better overall.  Large bm earlier today, cont's to pass sm amts flatus via stoma thru the day.  no abd pain or n/v  BP, pulse, U/O all satisfactory.  Minimal and mostly clear NGT output currently.  Lung fields clear.  Abd softer and less distended compared to yesterday, non-tender, BS (+), stoma still edematous but healthy.  Midline wound clean.  Labs noted, WBC still wnl.  Pathology noted and explained.  CT's of abd reviewed with reports.    IMP:  Abd benign.  Picture c/w resolution of ileus vs early p.o. SBO, favor the former.  PLAN:  Cont NPO and NGT overnite.  If he cont's to do well, clamp NGT in am and remove if 4-hr residual is satisfactory.             Will then advance diet slowly and make appropriate d/c plans re: wound and ostomy care.             All questions answered.
Patient seen and examined at bedside with surgical house staff, 1400 hrs. he is alert and stable and feels much better overall. He only has abdominal pain when ambulating or coughing. Barcenas removed earlier and he has already voided clear urine. NG tube is clamped, will be removed pending a satisfactory for our residual. The abdomen is soft and less distended overall. Bowel sounds are present, the midline wound is clean, the stoma is edematous but otherwise healthy in appearance. No stool or flatus.  Intake and output records reviewed.  Today's labs reviewed. All cultures still pending.  Infectious disease followup appreciated.    Plan:  Continue incentive spirometry, DVT prophylaxis            Decrease IV fluid rate as drop in hematocrit is likely due to third space mobilization            Continue IV antibiotics as ordered            Rectal suppository tomorrow morning to help empty the Cruzito's pouch.    All questions answered and he is happy with his progress.
Patient seen and examined with surgery team on rounds and discussed management plans. Patient comfortable colostomy functioning well with stool and gas now abd less distended midline wound dressing changed, Outpatient fu with Dr. Magdaleno next week.
Pt seen and examined @ bedside, 1000hrs.  No fevers, BP satisfactory.  alert and stable, feels much better overall. Appetite returning, more gas and feces via stoma, voids w/o problems  Abd soft, not tense or distended.  Stomal looks healthy and fns well.  Midline wound clean, repacked.  Today's labs noted, WBC still 13K, Hct stable.    Will start po clears today, advance to regular diet tomorrow if all OK  Will confirm d/c planning with wound and stomal care.  Full local care/diet/activity instructions given.  Pt will use a glycerine suppository weekly.   Will hopefully be able to d/c to home tomorrow pm.  Will arrange medical and GI f/u as outpatient.
Pt seen and examined @ bedside with surgical team and wife present, 1645 hrs:  He looks and feels slightly better, but pain returns after analgesics wear off.  No dysuria or pneumaturia, urine now darker.  2 episodes of bilious emesis.  No fever/chills, no bm, small amt of flatus earlier.  Has significant pain when moving in bed or trying to walk.  Pt stable, not tachy, no temp spikes.  Chest clear, abd still distended and quiet.  No CVAT.  Abd is diffusely tender, mostly so in the lower quadrants, rebound and guarding, unchanged from my exam yesterday.  Labs noted, CXR and AXR noted.    IMP:  Perforated sigmoid diverticulitis, no significant improvement on IV axbx and hydration. Ileus has not improved either.  I do not feel further non-operative Rx is appropriate, with significant increased risks if surgery is delayed.  Plan for exp lap and Cruzito's procedure, likely with open wound management.  Procedure explained in full with R/B/A/possible sequellae of no surgical intervention.  Anticipated post-op course explained, with plans for eventual colostomy closure after an appropriate interval.  All questions answered and they understand and agree.  Consent obtained and on chart.
As noted above    No acute events overnight, pt c/o nausea.    afeb, vss    abd soft, mildly distended    WBC 7    Imp: Slow gradual improvement s/p huang's procedure, possible early ileus.    plan: OOB to chair/ambulate           Zofran           D/C merissa?
Pt seen and examined @ bedside, house staff present, 1000 hours.  Tolerating small amts of clears po, but with belching.  alert and stable, no fevers, BP elevated as noted.  No BM, scant flatus, stoma looks healthy, still edematous.  Chest clear, Abd distended but soft. still quiet.  Midline wound clean and granulating, repacked and redressed.  Legs soft.  TM output remains serous, amounts noted.  No labs available today, OR cultures were negative, pathology still pending.    IMP: Persistent p.o. ileus, no signs of sepsis.  PLAN:  continue IVF, add hard candy and gum-chewing, ambulation.             continue DVT prophylaxis             await return of bowel fn.             remove MT today.

## 2020-02-15 NOTE — PROGRESS NOTE ADULT - NSHPATTENDINGPLANDISCUSS_GEN_ALL_CORE
Pt and surgical resident.
Pt/house staff
patient and surgical team.
Pt, wife, surgical resident and PA.
patient
Pt/resident.

## 2020-02-15 NOTE — CHART NOTE - NSCHARTNOTEFT_GEN_A_CORE
Registered Dietitian Follow-Up    Scheduled at risk f/u. NST has been now following.    RD (Food and Nutrition Department) to sign off.

## 2020-02-15 NOTE — DISCHARGE NOTE PROVIDER - CARE PROVIDER_API CALL
Be Magdaleno)  Surgery  [address] [place] [address], [address] [place] [address]  Phone: [phone]  Fax: [phone]  Follow Up Time: 1 week

## 2020-02-15 NOTE — DISCHARGE NOTE PROVIDER - NSDCFUADDINST_GEN_ALL_CORE_FT
if any fever over 102 , call office or report to nearest ER    colostomy care   wet to dry dressing daily to midline incision

## 2020-02-15 NOTE — PROGRESS NOTE ADULT - SUBJECTIVE AND OBJECTIVE BOX
GENERAL SURGERY PROGRESS NOTE     ROMAIN SMITH  [address] [place] [address] day :11d  Procedure: Closure of enterotomy of small bowel  Open sigmoidectomy  Creation of end colostomy  Exploratory laparotomy  Cruzito's procedure    Surgical Attending: Be Magdaleno  Overnight events: No acute events overnight. Tolerating clears, gas and stool in ostomy, denies nausea/vomiting. Voiding without issue    T(F): 97.3 (02-15-20 @ 05:15), Max: 98.2 (02-14-20 @ 14:32)  HR: 72 (02-15-20 @ 05:15) (72 - 89)  BP: 116/74 (02-15-20 @ 05:15) (94/72 - 132/79)  ABP: --  ABP(mean): --  RR: 16 (02-15-20 @ 05:15) (16 - 18)  SpO2: --      02-14-20 @ 07:01  -  02-15-20 @ 07:00  --------------------------------------------------------  IN:  Total IN: 0 mL    OUT:    Voided: 800 mL  Total OUT: 800 mL    Total NET: -800 mL        DIET/FLUIDS: fat emulsion (Plant Based) 20% Infusion 1.23 Gm/kG/Day IV Continuous <Continuous>  Parenteral Nutrition - Adult 1 Each TPN Continuous <Continuous>         GI proph:  pantoprazole  Injectable 40 milliGRAM(s) IV Push daily    AC/ proph:   ABx:     PHYSICAL EXAM:  GENERAL: NAD, well-appearing  CHEST/LUNG: Clear to auscultation bilaterally  HEART: Regular rate and rhythm  ABDOMEN: Soft, Nontender, Nondistended; midline incision with packing replaced, c/d/i, no erythema, purulent drainage, ostomy with stool and gas        LABS  Labs:  CAPILLARY BLOOD GLUCOSE                              11.2   13.12 )-----------( 474      ( 14 Feb 2020 07:47 )             33.7         02-14    137  |  102  |  11  ----------------------------<  103<H>  4.2   |  23  |  0.6<L>

## 2020-02-15 NOTE — PROGRESS NOTE ADULT - REASON FOR ADMISSION
Perforated Diverticulitis

## 2020-02-18 PROBLEM — Z00.00 ENCOUNTER FOR PREVENTIVE HEALTH EXAMINATION: Status: ACTIVE | Noted: 2020-02-18

## 2020-02-20 DIAGNOSIS — K57.20 DIVERTICULITIS OF LARGE INTESTINE WITH PERFORATION AND ABSCESS WITHOUT BLEEDING: ICD-10-CM

## 2020-02-20 DIAGNOSIS — K56.7 ILEUS, UNSPECIFIED: ICD-10-CM

## 2020-02-24 ENCOUNTER — APPOINTMENT (OUTPATIENT)
Dept: SURGERY | Facility: CLINIC | Age: 50
End: 2020-02-24
Payer: COMMERCIAL

## 2020-02-24 VITALS
BODY MASS INDEX: 25.02 KG/M2 | TEMPERATURE: 98.4 F | WEIGHT: 167 LBS | SYSTOLIC BLOOD PRESSURE: 118 MMHG | HEART RATE: 100 BPM | DIASTOLIC BLOOD PRESSURE: 70 MMHG | HEIGHT: 68.5 IN

## 2020-02-24 PROCEDURE — 99024 POSTOP FOLLOW-UP VISIT: CPT

## 2020-03-19 ENCOUNTER — APPOINTMENT (OUTPATIENT)
Dept: SURGERY | Facility: CLINIC | Age: 50
End: 2020-03-19
Payer: COMMERCIAL

## 2020-03-19 VITALS
DIASTOLIC BLOOD PRESSURE: 72 MMHG | WEIGHT: 176 LBS | SYSTOLIC BLOOD PRESSURE: 95 MMHG | TEMPERATURE: 97.7 F | HEIGHT: 68.5 IN | HEART RATE: 88 BPM | BODY MASS INDEX: 26.37 KG/M2

## 2020-03-19 PROCEDURE — 99024 POSTOP FOLLOW-UP VISIT: CPT

## 2020-03-19 NOTE — PHYSICAL EXAM
[Normal Thyroid] : the thyroid was normal [Normal Breath Sounds] : Normal breath sounds [Normal Heart Sounds] : normal heart sounds [Normal Rate and Rhythm] : normal rate and rhythm [No HSM] : no hepatosplenomegaly [Abdominal Masses] : No abdominal masses [Abdomen Tenderness] : ~T ~M No abdominal tenderness [de-identified] : no adenopathy [de-identified] : Soft and not distended. The left lower quadrant ostomy is well everted and appears healthy. No peristomal excoriation or hernia. The midline wound is clean and the 3 open areas have granulated above the skin surface.  The remainder of the wound is clean dry and intact.  the skin sutures were removed without difficulty. The open granulated areas were treated with topical Silver nitrate and the wound was redressed.

## 2020-03-19 NOTE — HISTORY OF PRESENT ILLNESS
[de-identified] : The patient returns for continued postop followup after a Alvarez's procedure for perforated sigmoid diverticulitis. He looks and feels well and has no specific complaints other than some occasional frequent bowel movements in the morning. There is no diarrhea, blood per rectum or melena. He does admit to a weight gain of about 6 pounds since his last visit.  He has not yet seen Dr. Diane for medical evaluation but is scheduled to do so later this month.

## 2020-03-19 NOTE — ASSESSMENT
[FreeTextEntry1] : Very good postoperative progress with no problems noted at this time. He was strongly warned about continued weight gain in terms of preparation for the re-exploration and colostomy closure.  Diet and activity instructions were reviewed.  Her is referred to Dr. Bolaños for evaluation for a colonoscopy via the stoma and the rectum. The rationale for this was discussed in full. All his questions were answered and he will return here in about 3 months for reevaluation, or sooner as needed.

## 2020-03-19 NOTE — ASSESSMENT
[FreeTextEntry1] : Very good postoperative recovery so far,  no problems were noted.  We reviewed his surgical procedure and pathology and he will return in about one month. A note was provided for his visiting nurses to eliminate the wound packing, and he can shower fully. Diet and activity instructions were reviewed and he was cautioned to avoid gaining weight.\par Arrangements will be made for him to have a consultation with Dr. TIN Diane for regular medical evaluation and followup. Arrangements for a GI evaluation and eventual colonoscopy will be made at the next office visit.  All his questions were answered and he is happy with the assessment and plan.

## 2020-03-19 NOTE — HISTORY OF PRESENT ILLNESS
[de-identified] : The patient comes for his first postoperative visit after his recent Alvarez's procedure for perforated diverticulitis. He looks and feels much better since discharge, but lost a significant amount of weight while an inpatient. His appetite, bowel and bladder function have been satisfactory and he continues to have regular followup nursing visits regarding his colostomy and wound care. He has no specific complaints at this time.

## 2020-07-17 ENCOUNTER — APPOINTMENT (OUTPATIENT)
Dept: SURGERY | Facility: CLINIC | Age: 50
End: 2020-07-17
Payer: COMMERCIAL

## 2020-07-17 VITALS
HEIGHT: 68.5 IN | TEMPERATURE: 97.6 F | WEIGHT: 178 LBS | SYSTOLIC BLOOD PRESSURE: 118 MMHG | HEART RATE: 90 BPM | DIASTOLIC BLOOD PRESSURE: 62 MMHG | BODY MASS INDEX: 26.67 KG/M2

## 2020-07-17 PROCEDURE — 99213 OFFICE O/P EST LOW 20 MIN: CPT

## 2020-07-17 NOTE — HISTORY OF PRESENT ILLNESS
[de-identified] : Nate is a 49 year old man who had Alvarez's procedure for perforated sigmoid diverticulitis. \par He has some irritation from the adhesive. There is a bulge around the stoma. \par He looks and feels well and has no specific complaints other than some occasional frequent bowel movements in the morning. There is no diarrhea, blood  or melena.

## 2020-07-17 NOTE — ASSESSMENT
[FreeTextEntry1] : Nate is a 49 year old man who had Alvarez's procedure for perforated sigmoid diverticulitis. \par He has some irritation from the adhesive. There is a bulge around the stoma. \par He looks and feels well and has no specific complaints other than some occasional frequent bowel movements in the morning. There is no diarrhea, blood  or melena.   \par \par \par exam: Soft and not distended. No hernias noted. Midline incision is clean and closed with no drainage or other problems noted. Colostomy stoma appears healthy with no peristomal excoriation and no obvious parastomal hernia.\par \par Very good postoperative progress with no problems.\par \par He needs a repeat CT scan and a colonoscopy before surgery .\par He was referred to Dr. Bolaños for evaluation for a colonoscopy via the stoma and the rectum previously , but he had not got it done. He wants everything done ASAP.\par \par We will set him up for CT and Colonoscopy. \par He will follow up with Dr. Magdaleno or One of the Colorectal surgeons.

## 2020-07-17 NOTE — PHYSICAL EXAM
[Normal Breath Sounds] : Normal breath sounds [Normal Heart Sounds] : normal heart sounds [JVD] : no jugular venous distention  [Abdomen Tenderness] : ~T ~M No abdominal tenderness [Abdominal Masses] : No abdominal masses [Purpura] : no purpura  [No HSM] : no hepatosplenomegaly [Alert] : alert [de-identified] : Normal [Calm] : calm [de-identified] : Normal [de-identified] : Soft and not distended. No hernias noted. Midline incision is clean and closed with no drainage or other problems noted. Colostomy stoma appears healthy with no peristomal excoriation and no obvious parastomal hernia.

## 2020-07-18 ENCOUNTER — LABORATORY RESULT (OUTPATIENT)
Age: 50
End: 2020-07-18

## 2020-07-18 ENCOUNTER — OUTPATIENT (OUTPATIENT)
Dept: OUTPATIENT SERVICES | Facility: HOSPITAL | Age: 50
LOS: 1 days | Discharge: HOME | End: 2020-07-18

## 2020-07-18 DIAGNOSIS — Z11.59 ENCOUNTER FOR SCREENING FOR OTHER VIRAL DISEASES: ICD-10-CM

## 2020-07-21 ENCOUNTER — OUTPATIENT (OUTPATIENT)
Dept: OUTPATIENT SERVICES | Facility: HOSPITAL | Age: 50
LOS: 1 days | Discharge: HOME | End: 2020-07-21
Payer: COMMERCIAL

## 2020-07-21 ENCOUNTER — TRANSCRIPTION ENCOUNTER (OUTPATIENT)
Age: 50
End: 2020-07-21

## 2020-07-21 ENCOUNTER — APPOINTMENT (OUTPATIENT)
Dept: SURGERY | Facility: HOSPITAL | Age: 50
End: 2020-07-21

## 2020-07-21 VITALS
DIASTOLIC BLOOD PRESSURE: 70 MMHG | TEMPERATURE: 98 F | HEIGHT: 67 IN | SYSTOLIC BLOOD PRESSURE: 115 MMHG | WEIGHT: 177.03 LBS | HEART RATE: 72 BPM | RESPIRATION RATE: 18 BRPM

## 2020-07-21 VITALS — TEMPERATURE: 98 F

## 2020-07-21 DIAGNOSIS — Z90.49 ACQUIRED ABSENCE OF OTHER SPECIFIED PARTS OF DIGESTIVE TRACT: Chronic | ICD-10-CM

## 2020-07-21 PROCEDURE — 45378 DIAGNOSTIC COLONOSCOPY: CPT

## 2020-07-21 NOTE — H&P ADULT - HISTORY OF PRESENT ILLNESS
Patient is 49M s/p Cruzito's procedure for perforated sigmoid diverticulitis who presents for preoperative colonoscopy prior to colostomy closure.  No personal history of polyps or family history of colorectal cancer

## 2020-07-21 NOTE — ASU PREOP CHECKLIST - NOTHING BY MOUTH SINCE
[facility] Initial Evaluation  Subjective:  HPI                    Objective:  System    Physical Exam    General     ROS    Assessment/Plan:    PROGRESS  REPORT    Progress reporting period is from 4/23/2018 to 5/14/2018.     SUBJECTIVE  Subjective: doing well until she went to work this morning and started having burning in leg after about 3 hours, an had been closer to the 5 hour farhan.Sitting position triggers the pain.   Progression of exercises are slow.  Patient is deconditioned and fatigues easily but willing to try exercises as she can Current Pain level: 6/10   Initial Pain level: 8/10   Changes in function: Yes, see goal flow sheet for change in function. beginning to tolerate sitting for a longer period of time but progress is still volital  Adverse reactions: None  The subjective and objective information are from the last SOAP note on this patient.    OBJECTIVE  Objective:  along the right side SI joint- tender to the touch, burning in leg down to toes. lsight pelvic rotation noted as well. Poor muscle strenght to progress ex program, but was doing a few ex to help stabilize pelvis       ASSESSMENT/PLAN  Updated problem list and treatment plan: Diagnosis 1:  Right hip pain, SI (area)pain  Pain -  hot/cold therapy, manual therapy, splint/taping/bracing/orthotics and self management  Decreased strength - therapeutic exercise and home program  STG/LTGs have been met or progress has been made towards goals:  Yes (See Goal flow sheet completed today.)  Assessment of Progress: The patient's condition has potential to improve.  Self Management Plans:  Patient has been instructed in a home treatment program.  I have re-evaluated this patient and find that the nature, scope, duration and intensity of the therapy is appropriate for the medical condition of the patient.  Nieves continues to require the following intervention to meet STG and LTG's: PT  The patient is returning to your office  for a recheck appointment.    Recommendations:  This patient would benefit from continued therapy.     Frequency:  1  X week, once daily  Duration:  for 4 additional  weeks      Would also like to order and SI belt for Patient.  When we have tried in clinic, the pain reduces.  Decreases pressure at the SI joint and gives her pelvic stability that we are working to improve with exercises at PT  Please refer to the attached sheet to sign and return to clinic and I will send to  adjustor for approval.  Fax: [phone]      Please refer to the daily flowsheet for treatment today, total treatment time and time spent performing 1:1 timed codes.       20-Jul-2020 23:00

## 2020-07-21 NOTE — ASU DISCHARGE PLAN (ADULT/PEDIATRIC) - CALL YOUR DOCTOR IF YOU HAVE ANY OF THE FOLLOWING:
Nausea and vomiting that does not stop/Bleeding that does not stop/Swelling that gets worse/Excessive diarrhea/Pain not relieved by Medications/Inability to tolerate liquids or foods/Unable to urinate/Fever greater than (need to indicate Fahrenheit or Celsius)

## 2020-07-22 LAB
ANION GAP SERPL CALC-SCNC: 15 MMOL/L
BUN SERPL-MCNC: 13 MG/DL
CALCIUM SERPL-MCNC: 10.6 MG/DL
CHLORIDE SERPL-SCNC: 101 MMOL/L
CO2 SERPL-SCNC: 25 MMOL/L
CREAT SERPL-MCNC: 1 MG/DL
GLUCOSE SERPL-MCNC: 96 MG/DL
POTASSIUM SERPL-SCNC: 4.8 MMOL/L
SODIUM SERPL-SCNC: 141 MMOL/L

## 2020-07-24 ENCOUNTER — OUTPATIENT (OUTPATIENT)
Dept: OUTPATIENT SERVICES | Facility: HOSPITAL | Age: 50
LOS: 1 days | Discharge: HOME | End: 2020-07-24
Payer: COMMERCIAL

## 2020-07-24 ENCOUNTER — RESULT REVIEW (OUTPATIENT)
Age: 50
End: 2020-07-24

## 2020-07-24 DIAGNOSIS — K57.20 DIVERTICULITIS OF LARGE INTESTINE WITH PERFORATION AND ABSCESS WITHOUT BLEEDING: ICD-10-CM

## 2020-07-24 DIAGNOSIS — Z90.49 ACQUIRED ABSENCE OF OTHER SPECIFIED PARTS OF DIGESTIVE TRACT: Chronic | ICD-10-CM

## 2020-07-24 DIAGNOSIS — Z79.1 LONG TERM (CURRENT) USE OF NON-STEROIDAL ANTI-INFLAMMATORIES (NSAID): ICD-10-CM

## 2020-07-24 DIAGNOSIS — K57.30 DIVERTICULOSIS OF LARGE INTESTINE WITHOUT PERFORATION OR ABSCESS WITHOUT BLEEDING: ICD-10-CM

## 2020-07-24 DIAGNOSIS — Z93.3 COLOSTOMY STATUS: ICD-10-CM

## 2020-07-24 PROBLEM — K57.92 DIVERTICULITIS OF INTESTINE, PART UNSPECIFIED, WITHOUT PERFORATION OR ABSCESS WITHOUT BLEEDING: Chronic | Status: ACTIVE | Noted: 2020-07-21

## 2020-07-24 PROCEDURE — 74177 CT ABD & PELVIS W/CONTRAST: CPT | Mod: 26

## 2020-08-03 ENCOUNTER — APPOINTMENT (OUTPATIENT)
Dept: SURGERY | Facility: CLINIC | Age: 50
End: 2020-08-03
Payer: COMMERCIAL

## 2020-08-03 VITALS
TEMPERATURE: 97.5 F | DIASTOLIC BLOOD PRESSURE: 76 MMHG | BODY MASS INDEX: 26.37 KG/M2 | HEIGHT: 68.5 IN | SYSTOLIC BLOOD PRESSURE: 124 MMHG | WEIGHT: 176 LBS | HEART RATE: 90 BPM

## 2020-08-03 PROCEDURE — 99214 OFFICE O/P EST MOD 30 MIN: CPT

## 2020-08-03 NOTE — ASSESSMENT
[FreeTextEntry1] : He has done very well overall following his Cruzito procedure and now wishes to have closure of the colostomy. The CT scan and colonoscopy show no contraindications, from a surgical standpoint, but the presence of the coronary calcifications requires preoperative evaluation. The patient will contact his primary care physician for referral to a cardiologist and a coronary CTA if thought to be appropriate.  Once this was completed we can schedule his surgery.\par \par We discussed the details of the colostomy closure procedure which would involve a full mechanical and antibiotic bowel prep and exploratory laparotomy with lysis of adhesions and re-anastomosis.  Risks and benefits were discussed and all his questions were answered; he understands and agrees and will contact me once his cardiology evaluation is completed

## 2020-08-03 NOTE — DATA REVIEWED
[FreeTextEntry1] : Reviewed recent CT scan of abdomen and pelvis with report and images on line, and I have also\par reviewed prior inpatient records

## 2020-08-03 NOTE — PHYSICAL EXAM
[Abdominal Masses] : No abdominal masses [Normal Heart Sounds] : normal heart sounds [Normal Thyroid] : the thyroid was normal [Normal Breath Sounds] : Normal breath sounds [Abdomen Tenderness] : ~T ~M No abdominal tenderness [No HSM] : no hepatosplenomegaly [de-identified] : no adenopathy [de-identified] : Somewhat protuberant but soft. Stoma appears healthy with no obvious parastomal hernia. No palpable masses, hernias or organomegaly. [de-identified] : healthy

## 2020-08-03 NOTE — HISTORY OF PRESENT ILLNESS
[de-identified] : The patient returns for reevaluation and looks well overall. He is back to work full-time without limitations but is being careful about straining his abdomen. He does have occasional difficulty controlling the colostomy based on his diet but he voids well and has no output per rectum. He reports his weight is stable and he has no abdominal pain. He has been seen by the primary care physician recently.\par He recently completed a colonoscopy by Dr. Looney both per rectum and per stoma and no lesions were noted.

## 2020-08-17 ENCOUNTER — TRANSCRIPTION ENCOUNTER (OUTPATIENT)
Age: 50
End: 2020-08-17

## 2020-08-18 ENCOUNTER — APPOINTMENT (OUTPATIENT)
Dept: CARDIOLOGY | Facility: CLINIC | Age: 50
End: 2020-08-18
Payer: COMMERCIAL

## 2020-08-18 VITALS — TEMPERATURE: 97.4 F | BODY MASS INDEX: 27.28 KG/M2 | HEIGHT: 68 IN | WEIGHT: 180 LBS

## 2020-08-18 VITALS — DIASTOLIC BLOOD PRESSURE: 70 MMHG | SYSTOLIC BLOOD PRESSURE: 110 MMHG | HEART RATE: 72 BPM | RESPIRATION RATE: 18 BRPM

## 2020-08-18 DIAGNOSIS — Z78.9 OTHER SPECIFIED HEALTH STATUS: ICD-10-CM

## 2020-08-18 DIAGNOSIS — Z87.891 PERSONAL HISTORY OF NICOTINE DEPENDENCE: ICD-10-CM

## 2020-08-18 DIAGNOSIS — I25.84 ATHEROSCLEROTIC HEART DISEASE OF NATIVE CORONARY ARTERY W/OUT ANGINA PECTORIS: ICD-10-CM

## 2020-08-18 DIAGNOSIS — I25.10 ATHEROSCLEROTIC HEART DISEASE OF NATIVE CORONARY ARTERY W/OUT ANGINA PECTORIS: ICD-10-CM

## 2020-08-18 DIAGNOSIS — Z87.19 PERSONAL HISTORY OF OTHER DISEASES OF THE DIGESTIVE SYSTEM: ICD-10-CM

## 2020-08-18 PROCEDURE — 99204 OFFICE O/P NEW MOD 45 MIN: CPT

## 2020-08-18 PROCEDURE — 93000 ELECTROCARDIOGRAM COMPLETE: CPT | Mod: NC

## 2020-08-18 NOTE — PHYSICAL EXAM
[General Appearance - Well Developed] : well developed [Well Groomed] : well groomed [Normal Appearance] : normal appearance [General Appearance - Well Nourished] : well nourished [No Deformities] : no deformities [General Appearance - In No Acute Distress] : no acute distress [Normal Conjunctiva] : the conjunctiva exhibited no abnormalities [Eyelids - No Xanthelasma] : the eyelids demonstrated no xanthelasmas [Normal Oral Mucosa] : normal oral mucosa [No Oral Pallor] : no oral pallor [No Oral Cyanosis] : no oral cyanosis [Normal Jugular Venous A Waves Present] : normal jugular venous A waves present [Normal Jugular Venous V Waves Present] : normal jugular venous V waves present [Heart Rate And Rhythm] : heart rate and rhythm were normal [No Jugular Venous Coleman A Waves] : no jugular venous coleman A waves [Heart Sounds] : normal S1 and S2 [Murmurs] : no murmurs present [Auscultation Breath Sounds / Voice Sounds] : lungs were clear to auscultation bilaterally [Exaggerated Use Of Accessory Muscles For Inspiration] : no accessory muscle use [Respiration, Rhythm And Depth] : normal respiratory rhythm and effort [Abdomen Soft] : soft [Bowel Sounds] : normal bowel sounds [Abdomen Mass (___ Cm)] : no abdominal mass palpated [Abdomen Tenderness] : non-tender [Abnormal Walk] : normal gait [FreeTextEntry1] : LLQ colostomy [Nail Clubbing] : no clubbing of the fingernails [Gait - Sufficient For Exercise Testing] : the gait was sufficient for exercise testing [Petechial Hemorrhages (___cm)] : no petechial hemorrhages [Cyanosis, Localized] : no localized cyanosis [Skin Color & Pigmentation] : normal skin color and pigmentation [] : no rash [Skin Lesions] : no skin lesions [No Venous Stasis] : no venous stasis [No Xanthoma] : no  xanthoma was observed [No Skin Ulcers] : no skin ulcer [Oriented To Time, Place, And Person] : oriented to person, place, and time

## 2020-08-28 ENCOUNTER — APPOINTMENT (OUTPATIENT)
Dept: CARDIOLOGY | Facility: CLINIC | Age: 50
End: 2020-08-28
Payer: COMMERCIAL

## 2020-08-28 DIAGNOSIS — Z01.810 ENCOUNTER FOR PREPROCEDURAL CARDIOVASCULAR EXAMINATION: ICD-10-CM

## 2020-08-28 DIAGNOSIS — I35.1 NONRHEUMATIC AORTIC (VALVE) INSUFFICIENCY: ICD-10-CM

## 2020-08-28 PROCEDURE — 93306 TTE W/DOPPLER COMPLETE: CPT

## 2020-08-28 PROCEDURE — 93015 CV STRESS TEST SUPVJ I&R: CPT

## 2020-09-17 ENCOUNTER — OUTPATIENT (OUTPATIENT)
Dept: OUTPATIENT SERVICES | Facility: HOSPITAL | Age: 50
LOS: 1 days | Discharge: HOME | End: 2020-09-17
Payer: COMMERCIAL

## 2020-09-17 VITALS
SYSTOLIC BLOOD PRESSURE: 112 MMHG | DIASTOLIC BLOOD PRESSURE: 77 MMHG | WEIGHT: 186.07 LBS | HEART RATE: 80 BPM | TEMPERATURE: 98 F | OXYGEN SATURATION: 98 % | HEIGHT: 68 IN | RESPIRATION RATE: 12 BRPM

## 2020-09-17 DIAGNOSIS — Z90.49 ACQUIRED ABSENCE OF OTHER SPECIFIED PARTS OF DIGESTIVE TRACT: Chronic | ICD-10-CM

## 2020-09-17 LAB
A1C WITH ESTIMATED AVERAGE GLUCOSE RESULT: 6 % — HIGH (ref 4–5.6)
ALBUMIN SERPL ELPH-MCNC: 5.1 G/DL — SIGNIFICANT CHANGE UP (ref 3.5–5.2)
ALP SERPL-CCNC: 87 U/L — SIGNIFICANT CHANGE UP (ref 30–115)
ALT FLD-CCNC: 22 U/L — SIGNIFICANT CHANGE UP (ref 0–41)
ANION GAP SERPL CALC-SCNC: 15 MMOL/L — HIGH (ref 7–14)
APPEARANCE UR: CLEAR — SIGNIFICANT CHANGE UP
APTT BLD: 28 SEC — SIGNIFICANT CHANGE UP (ref 27–39.2)
AST SERPL-CCNC: 22 U/L — SIGNIFICANT CHANGE UP (ref 0–41)
BILIRUB SERPL-MCNC: 0.2 MG/DL — SIGNIFICANT CHANGE UP (ref 0.2–1.2)
BILIRUB UR-MCNC: NEGATIVE — SIGNIFICANT CHANGE UP
BLD GP AB SCN SERPL QL: SIGNIFICANT CHANGE UP
BUN SERPL-MCNC: 13 MG/DL — SIGNIFICANT CHANGE UP (ref 10–20)
CALCIUM SERPL-MCNC: 9.7 MG/DL — SIGNIFICANT CHANGE UP (ref 8.5–10.1)
CHLORIDE SERPL-SCNC: 102 MMOL/L — SIGNIFICANT CHANGE UP (ref 98–110)
CO2 SERPL-SCNC: 21 MMOL/L — SIGNIFICANT CHANGE UP (ref 17–32)
COLOR SPEC: SIGNIFICANT CHANGE UP
CREAT SERPL-MCNC: 0.8 MG/DL — SIGNIFICANT CHANGE UP (ref 0.7–1.5)
DIFF PNL FLD: NEGATIVE — SIGNIFICANT CHANGE UP
ESTIMATED AVERAGE GLUCOSE: 126 MG/DL — HIGH (ref 68–114)
GLUCOSE SERPL-MCNC: 98 MG/DL — SIGNIFICANT CHANGE UP (ref 70–99)
GLUCOSE UR QL: NEGATIVE — SIGNIFICANT CHANGE UP
INR BLD: 0.93 RATIO — SIGNIFICANT CHANGE UP (ref 0.65–1.3)
KETONES UR-MCNC: NEGATIVE — SIGNIFICANT CHANGE UP
LEUKOCYTE ESTERASE UR-ACNC: NEGATIVE — SIGNIFICANT CHANGE UP
MRSA PCR RESULT.: NEGATIVE — SIGNIFICANT CHANGE UP
NITRITE UR-MCNC: NEGATIVE — SIGNIFICANT CHANGE UP
PH UR: 6 — SIGNIFICANT CHANGE UP (ref 5–8)
POTASSIUM SERPL-MCNC: 4.8 MMOL/L — SIGNIFICANT CHANGE UP (ref 3.5–5)
POTASSIUM SERPL-SCNC: 4.8 MMOL/L — SIGNIFICANT CHANGE UP (ref 3.5–5)
PROT SERPL-MCNC: 7.8 G/DL — SIGNIFICANT CHANGE UP (ref 6–8)
PROT UR-MCNC: NEGATIVE — SIGNIFICANT CHANGE UP
PROTHROM AB SERPL-ACNC: 10.7 SEC — SIGNIFICANT CHANGE UP (ref 9.95–12.87)
SODIUM SERPL-SCNC: 138 MMOL/L — SIGNIFICANT CHANGE UP (ref 135–146)
SP GR SPEC: 1.02 — SIGNIFICANT CHANGE UP (ref 1.01–1.03)
UROBILINOGEN FLD QL: SIGNIFICANT CHANGE UP

## 2020-09-17 PROCEDURE — 93010 ELECTROCARDIOGRAM REPORT: CPT

## 2020-09-17 RX ORDER — IBUPROFEN 200 MG
1 TABLET ORAL
Qty: 0 | Refills: 0 | DISCHARGE

## 2020-09-17 NOTE — H&P PST ADULT - PRIMARY CARE PROVIDER
PMD: Viral (last appt PMD: Viral (per patient appt pending per Dr. Magdaleno's office); Cardio: Jayesh (per patient appt pending per Dr. Magdaleno's office)

## 2020-09-17 NOTE — H&P PST ADULT - HISTORY OF PRESENT ILLNESS
50yMale presents today for presurgical testing for exploratory laparotomy, colostomy closure.   Patient denies any CP, palpitations, SOB, cough, or dysuria. No recent URI or UTI.  Stated exercise tolerance is FOS 5           MICHAEL screen reviewed    Patient denies any recent personal exposure to COVID19. Denies any sick contacts. Patient denies any travel within the past 30 days. Patient was instructed to quarantine until after procedure    Encounter for other preprocedural examination  Diverticulitis of large intestine with perforation and abscess without bleeding  ^K57.20, [id number]                                                                 AM [place] 9/22/20    Encounter for other preprocedural examination  Diverticulitis of large intestine with perforation and abscess without bleeding  Diverticulitis  S/P colon resection    Anesthesia Alert  NO--Difficult Airway  NO--History of neck surgery or radiation  NO--Limited ROM of neck  NO--History of Malignant hyperthermia  NO--No personal or family history of Pseudocholinesterase deficiency.  NO--Prior Anesthesia Complication  NO--Latex Allergy  NO--Loose teeth  NO--History of Rheumatoid Arthritis  NO--MICHAEL  NO--Other_____    Patient states that this is their complete medical history and list of medications 50yMale presents today for presurgical testing for exploratory laparotomy, colostomy closure.   Patient denies any CP, palpitations, SOB, cough, or dysuria. No recent URI or UTI.  Stated exercise tolerance is FOS 5           MICHAEL screen reviewed    Patient denies any recent personal exposure to COVID19. Denies any sick contacts. Patient denies any travel within the past 30 days. Patient was instructed to quarantine until after procedure    Encounter for other preprocedural examination  Diverticulitis of large intestine with perforation and abscess without bleeding  ^K57.20, [id number]                                                                 AM [place] 9/22/20    Encounter for other preprocedural examination  Diverticulitis of large intestine with perforation and abscess without bleeding  Diverticulitis  S/P colon resection    Anesthesia Alert  YES--Difficult Airway - class IV  NO--History of neck surgery or radiation  NO--Limited ROM of neck  NO--History of Malignant hyperthermia  NO--No personal or family history of Pseudocholinesterase deficiency.  NO--Prior Anesthesia Complication  NO--Latex Allergy  NO--Loose teeth  NO--History of Rheumatoid Arthritis  NO--MICHAEL  YES--Other - limited ability to open jaw fully (patient denies trauma or pain to jaw/mouth)    Patient states that this is their complete medical history and list of medications

## 2020-09-19 ENCOUNTER — LABORATORY RESULT (OUTPATIENT)
Age: 50
End: 2020-09-19

## 2020-09-19 ENCOUNTER — OUTPATIENT (OUTPATIENT)
Dept: OUTPATIENT SERVICES | Facility: HOSPITAL | Age: 50
LOS: 1 days | Discharge: HOME | End: 2020-09-19

## 2020-09-19 DIAGNOSIS — Z90.49 ACQUIRED ABSENCE OF OTHER SPECIFIED PARTS OF DIGESTIVE TRACT: Chronic | ICD-10-CM

## 2020-09-19 DIAGNOSIS — Z11.59 ENCOUNTER FOR SCREENING FOR OTHER VIRAL DISEASES: ICD-10-CM

## 2020-10-03 ENCOUNTER — LABORATORY RESULT (OUTPATIENT)
Age: 50
End: 2020-10-03

## 2020-10-03 ENCOUNTER — OUTPATIENT (OUTPATIENT)
Dept: OUTPATIENT SERVICES | Facility: HOSPITAL | Age: 50
LOS: 1 days | Discharge: HOME | End: 2020-10-03

## 2020-10-03 DIAGNOSIS — Z11.59 ENCOUNTER FOR SCREENING FOR OTHER VIRAL DISEASES: ICD-10-CM

## 2020-10-03 DIAGNOSIS — Z90.49 ACQUIRED ABSENCE OF OTHER SPECIFIED PARTS OF DIGESTIVE TRACT: Chronic | ICD-10-CM

## 2020-10-04 ENCOUNTER — NON-APPOINTMENT (OUTPATIENT)
Age: 50
End: 2020-10-04

## 2020-10-06 ENCOUNTER — INPATIENT (INPATIENT)
Facility: HOSPITAL | Age: 50
LOS: 2 days | Discharge: HOME HEALTH | End: 2020-10-09
Attending: SURGERY | Admitting: SURGERY
Payer: COMMERCIAL

## 2020-10-06 ENCOUNTER — RESULT REVIEW (OUTPATIENT)
Age: 50
End: 2020-10-06

## 2020-10-06 VITALS
WEIGHT: 184.97 LBS | RESPIRATION RATE: 18 BRPM | OXYGEN SATURATION: 99 % | SYSTOLIC BLOOD PRESSURE: 125 MMHG | DIASTOLIC BLOOD PRESSURE: 87 MMHG | TEMPERATURE: 98 F | HEART RATE: 87 BPM | HEIGHT: 68 IN

## 2020-10-06 DIAGNOSIS — Z90.49 ACQUIRED ABSENCE OF OTHER SPECIFIED PARTS OF DIGESTIVE TRACT: Chronic | ICD-10-CM

## 2020-10-06 LAB
ANION GAP SERPL CALC-SCNC: 13 MMOL/L — SIGNIFICANT CHANGE UP (ref 7–14)
APTT BLD: 23.9 SEC — CRITICAL LOW (ref 27–39.2)
BASOPHILS # BLD AUTO: 0.03 K/UL — SIGNIFICANT CHANGE UP (ref 0–0.2)
BASOPHILS NFR BLD AUTO: 0.2 % — SIGNIFICANT CHANGE UP (ref 0–1)
BUN SERPL-MCNC: 11 MG/DL — SIGNIFICANT CHANGE UP (ref 10–20)
CALCIUM SERPL-MCNC: 8.6 MG/DL — SIGNIFICANT CHANGE UP (ref 8.5–10.1)
CHLORIDE SERPL-SCNC: 105 MMOL/L — SIGNIFICANT CHANGE UP (ref 98–110)
CK MB CFR SERPL CALC: 1.6 NG/ML — SIGNIFICANT CHANGE UP (ref 0.6–6.3)
CK SERPL-CCNC: 105 U/L — SIGNIFICANT CHANGE UP (ref 0–225)
CO2 SERPL-SCNC: 20 MMOL/L — SIGNIFICANT CHANGE UP (ref 17–32)
CREAT SERPL-MCNC: 1 MG/DL — SIGNIFICANT CHANGE UP (ref 0.7–1.5)
EOSINOPHIL # BLD AUTO: 0.01 K/UL — SIGNIFICANT CHANGE UP (ref 0–0.7)
EOSINOPHIL NFR BLD AUTO: 0.1 % — SIGNIFICANT CHANGE UP (ref 0–8)
GLUCOSE BLDC GLUCOMTR-MCNC: 103 MG/DL — HIGH (ref 70–99)
GLUCOSE SERPL-MCNC: 168 MG/DL — HIGH (ref 70–99)
HCT VFR BLD CALC: 41.8 % — LOW (ref 42–52)
HGB BLD-MCNC: 13 G/DL — LOW (ref 14–18)
IMM GRANULOCYTES NFR BLD AUTO: 0.4 % — HIGH (ref 0.1–0.3)
INR BLD: 1.06 RATIO — SIGNIFICANT CHANGE UP (ref 0.65–1.3)
LYMPHOCYTES # BLD AUTO: 0.84 K/UL — LOW (ref 1.2–3.4)
LYMPHOCYTES # BLD AUTO: 5.1 % — LOW (ref 20.5–51.1)
MAGNESIUM SERPL-MCNC: 1.6 MG/DL — LOW (ref 1.8–2.4)
MCHC RBC-ENTMCNC: 23.6 PG — LOW (ref 27–31)
MCHC RBC-ENTMCNC: 31.1 G/DL — LOW (ref 32–37)
MCV RBC AUTO: 76 FL — LOW (ref 80–94)
MONOCYTES # BLD AUTO: 0.85 K/UL — HIGH (ref 0.1–0.6)
MONOCYTES NFR BLD AUTO: 5.1 % — SIGNIFICANT CHANGE UP (ref 1.7–9.3)
NEUTROPHILS # BLD AUTO: 14.84 K/UL — HIGH (ref 1.4–6.5)
NEUTROPHILS NFR BLD AUTO: 89.1 % — HIGH (ref 42.2–75.2)
NRBC # BLD: 0 /100 WBCS — SIGNIFICANT CHANGE UP (ref 0–0)
PHOSPHATE SERPL-MCNC: 3.6 MG/DL — SIGNIFICANT CHANGE UP (ref 2.1–4.9)
PLATELET # BLD AUTO: 202 K/UL — SIGNIFICANT CHANGE UP (ref 130–400)
POTASSIUM SERPL-MCNC: 4.4 MMOL/L — SIGNIFICANT CHANGE UP (ref 3.5–5)
POTASSIUM SERPL-SCNC: 4.4 MMOL/L — SIGNIFICANT CHANGE UP (ref 3.5–5)
PROTHROM AB SERPL-ACNC: 12.2 SEC — SIGNIFICANT CHANGE UP (ref 9.95–12.87)
RBC # BLD: 5.5 M/UL — SIGNIFICANT CHANGE UP (ref 4.7–6.1)
RBC # FLD: 14.5 % — SIGNIFICANT CHANGE UP (ref 11.5–14.5)
SODIUM SERPL-SCNC: 138 MMOL/L — SIGNIFICANT CHANGE UP (ref 135–146)
TROPONIN T SERPL-MCNC: <0.01 NG/ML — SIGNIFICANT CHANGE UP
WBC # BLD: 16.63 K/UL — HIGH (ref 4.8–10.8)
WBC # FLD AUTO: 16.63 K/UL — HIGH (ref 4.8–10.8)

## 2020-10-06 PROCEDURE — 88304 TISSUE EXAM BY PATHOLOGIST: CPT | Mod: 26

## 2020-10-06 PROCEDURE — 45300 PROCTOSIGMOIDOSCOPY DX: CPT | Mod: 59

## 2020-10-06 PROCEDURE — 64488 TAP BLOCK BI INJECTION: CPT | Mod: 47,XU

## 2020-10-06 PROCEDURE — 44626 REPAIR BOWEL OPENING: CPT

## 2020-10-06 RX ORDER — HYDROMORPHONE HYDROCHLORIDE 2 MG/ML
0.5 INJECTION INTRAMUSCULAR; INTRAVENOUS; SUBCUTANEOUS
Refills: 0 | Status: DISCONTINUED | OUTPATIENT
Start: 2020-10-06 | End: 2020-10-06

## 2020-10-06 RX ORDER — MAGNESIUM SULFATE 500 MG/ML
2 VIAL (ML) INJECTION ONCE
Refills: 0 | Status: COMPLETED | OUTPATIENT
Start: 2020-10-06 | End: 2020-10-06

## 2020-10-06 RX ORDER — SODIUM CHLORIDE 9 MG/ML
1000 INJECTION, SOLUTION INTRAVENOUS
Refills: 0 | Status: DISCONTINUED | OUTPATIENT
Start: 2020-10-06 | End: 2020-10-07

## 2020-10-06 RX ORDER — HEPARIN SODIUM 5000 [USP'U]/ML
5000 INJECTION INTRAVENOUS; SUBCUTANEOUS ONCE
Refills: 0 | Status: DISCONTINUED | OUTPATIENT
Start: 2020-10-06 | End: 2020-10-06

## 2020-10-06 RX ORDER — HYDROMORPHONE HYDROCHLORIDE 2 MG/ML
30 INJECTION INTRAMUSCULAR; INTRAVENOUS; SUBCUTANEOUS
Refills: 0 | Status: DISCONTINUED | OUTPATIENT
Start: 2020-10-06 | End: 2020-10-08

## 2020-10-06 RX ORDER — NALOXONE HYDROCHLORIDE 4 MG/.1ML
0.1 SPRAY NASAL
Refills: 0 | Status: DISCONTINUED | OUTPATIENT
Start: 2020-10-06 | End: 2020-10-08

## 2020-10-06 RX ORDER — ONDANSETRON 8 MG/1
4 TABLET, FILM COATED ORAL EVERY 6 HOURS
Refills: 0 | Status: DISCONTINUED | OUTPATIENT
Start: 2020-10-06 | End: 2020-10-09

## 2020-10-06 RX ORDER — HEPARIN SODIUM 5000 [USP'U]/ML
5000 INJECTION INTRAVENOUS; SUBCUTANEOUS EVERY 8 HOURS
Refills: 0 | Status: DISCONTINUED | OUTPATIENT
Start: 2020-10-06 | End: 2020-10-09

## 2020-10-06 RX ORDER — PANTOPRAZOLE SODIUM 20 MG/1
40 TABLET, DELAYED RELEASE ORAL DAILY
Refills: 0 | Status: DISCONTINUED | OUTPATIENT
Start: 2020-10-06 | End: 2020-10-08

## 2020-10-06 RX ORDER — CEFOTETAN DISODIUM 1 G
2 VIAL (EA) INJECTION EVERY 12 HOURS
Refills: 0 | Status: DISCONTINUED | OUTPATIENT
Start: 2020-10-06 | End: 2020-10-07

## 2020-10-06 RX ORDER — BUPIVACAINE 13.3 MG/ML
20 INJECTION, SUSPENSION, LIPOSOMAL INFILTRATION ONCE
Refills: 0 | Status: DISCONTINUED | OUTPATIENT
Start: 2020-10-06 | End: 2020-10-06

## 2020-10-06 RX ORDER — SODIUM CHLORIDE 9 MG/ML
1000 INJECTION, SOLUTION INTRAVENOUS
Refills: 0 | Status: DISCONTINUED | OUTPATIENT
Start: 2020-10-06 | End: 2020-10-06

## 2020-10-06 RX ADMIN — HYDROMORPHONE HYDROCHLORIDE 30 MILLILITER(S): 2 INJECTION INTRAMUSCULAR; INTRAVENOUS; SUBCUTANEOUS at 16:15

## 2020-10-06 RX ADMIN — HYDROMORPHONE HYDROCHLORIDE 0.5 MILLIGRAM(S): 2 INJECTION INTRAMUSCULAR; INTRAVENOUS; SUBCUTANEOUS at 15:30

## 2020-10-06 RX ADMIN — SODIUM CHLORIDE 125 MILLILITER(S): 9 INJECTION, SOLUTION INTRAVENOUS at 15:28

## 2020-10-06 RX ADMIN — ONDANSETRON 4 MILLIGRAM(S): 8 TABLET, FILM COATED ORAL at 19:44

## 2020-10-06 RX ADMIN — PANTOPRAZOLE SODIUM 40 MILLIGRAM(S): 20 TABLET, DELAYED RELEASE ORAL at 21:29

## 2020-10-06 RX ADMIN — HYDROMORPHONE HYDROCHLORIDE 0.5 MILLIGRAM(S): 2 INJECTION INTRAMUSCULAR; INTRAVENOUS; SUBCUTANEOUS at 15:15

## 2020-10-06 RX ADMIN — HYDROMORPHONE HYDROCHLORIDE 0.5 MILLIGRAM(S): 2 INJECTION INTRAMUSCULAR; INTRAVENOUS; SUBCUTANEOUS at 15:05

## 2020-10-06 RX ADMIN — Medication 50 GRAM(S): at 20:25

## 2020-10-06 RX ADMIN — Medication 100 GRAM(S): at 19:44

## 2020-10-06 RX ADMIN — HEPARIN SODIUM 5000 UNIT(S): 5000 INJECTION INTRAVENOUS; SUBCUTANEOUS at 21:29

## 2020-10-06 NOTE — BRIEF OPERATIVE NOTE - OPERATION/FINDINGS
ex-lap, IRINA, identified rectal stump with IRINA, takedown colostomy. TAP block ( 20 cc exparel + 30 cc marcaine + 100c NS).  colorectal anastomosis hand-sawn with 2/0 vicryl interrupted stitches.  hemostasis achieved

## 2020-10-06 NOTE — CHART NOTE - NSCHARTNOTEFT_GEN_A_CORE
PACU ANESTHESIA ADMISSION NOTE      Procedure: Block, transversus abdominis plane, bilateral    Closure, colostomy, Cruzito      Post op diagnosis:  History of diverticulitis of colon      __x__  Patent Airway    _x___  Full return of protective reflexes    __x__  Full recovery from anesthesia / back to baseline     Vitals:   T:    99.3       R:            15      BP:   118/83               Sat:   96%                P: 100      Mental Status:  _x___ Awake   _____ Alert   _____ Drowsy   _____ Sedated    Nausea/Vomiting:  _x___ NO  ______Yes,   See Post - Op Orders          Pain Scale (0-10):  _____    Treatment: ____ None    ___x_ See Post - Op/PCA Orders    Post - Operative Fluids:   ____ Oral   __x__ See Post - Op Orders    Plan: Discharge:   ____Home       __x___Floor     _____Critical Care    _____  Other:_________________    Comments:  Uneventful intraoperative course. No anesthesia issues or complications noted. Patient stable upon arrival to PACU. Report given to RN. Discharge when criteria met.

## 2020-10-06 NOTE — BRIEF OPERATIVE NOTE - NSICDXBRIEFPROCEDURE_GEN_ALL_CORE_FT
PROCEDURES:  Block, transversus abdominis plane, bilateral 06-Oct-2020 14:38:26  Saad Huertas  Closure, colostomy, Cruzito 06-Oct-2020 14:38:15  Saad Huertas

## 2020-10-06 NOTE — PATIENT PROFILE ADULT - FUNCTIONAL SCREEN CURRENT LEVEL: COMMUNICATION, MLM
Quality 110: Preventive Care And Screening: Influenza Immunization: Influenza Immunization Administered during Influenza season
Quality 431: Preventive Care And Screening: Unhealthy Alcohol Use - Screening: Patient screened for unhealthy alcohol use using a single question and scores less than 2 times per year
Detail Level: Detailed
0 = understands/communicates without difficulty

## 2020-10-07 LAB
ANION GAP SERPL CALC-SCNC: 11 MMOL/L — SIGNIFICANT CHANGE UP (ref 7–14)
ANION GAP SERPL CALC-SCNC: 11 MMOL/L — SIGNIFICANT CHANGE UP (ref 7–14)
BUN SERPL-MCNC: 11 MG/DL — SIGNIFICANT CHANGE UP (ref 10–20)
BUN SERPL-MCNC: 11 MG/DL — SIGNIFICANT CHANGE UP (ref 10–20)
CALCIUM SERPL-MCNC: 8.3 MG/DL — LOW (ref 8.5–10.1)
CALCIUM SERPL-MCNC: 8.9 MG/DL — SIGNIFICANT CHANGE UP (ref 8.5–10.1)
CHLORIDE SERPL-SCNC: 104 MMOL/L — SIGNIFICANT CHANGE UP (ref 98–110)
CHLORIDE SERPL-SCNC: 105 MMOL/L — SIGNIFICANT CHANGE UP (ref 98–110)
CO2 SERPL-SCNC: 22 MMOL/L — SIGNIFICANT CHANGE UP (ref 17–32)
CO2 SERPL-SCNC: 23 MMOL/L — SIGNIFICANT CHANGE UP (ref 17–32)
CREAT SERPL-MCNC: 1 MG/DL — SIGNIFICANT CHANGE UP (ref 0.7–1.5)
CREAT SERPL-MCNC: 1.1 MG/DL — SIGNIFICANT CHANGE UP (ref 0.7–1.5)
GLUCOSE SERPL-MCNC: 112 MG/DL — HIGH (ref 70–99)
GLUCOSE SERPL-MCNC: 125 MG/DL — HIGH (ref 70–99)
HCT VFR BLD CALC: 36.3 % — LOW (ref 42–52)
HCT VFR BLD CALC: 39.4 % — LOW (ref 42–52)
HGB BLD-MCNC: 11.3 G/DL — LOW (ref 14–18)
HGB BLD-MCNC: 12.2 G/DL — LOW (ref 14–18)
MAGNESIUM SERPL-MCNC: 2.1 MG/DL — SIGNIFICANT CHANGE UP (ref 1.8–2.4)
MAGNESIUM SERPL-MCNC: 2.2 MG/DL — SIGNIFICANT CHANGE UP (ref 1.8–2.4)
MCHC RBC-ENTMCNC: 23.5 PG — LOW (ref 27–31)
MCHC RBC-ENTMCNC: 23.6 PG — LOW (ref 27–31)
MCHC RBC-ENTMCNC: 31 G/DL — LOW (ref 32–37)
MCHC RBC-ENTMCNC: 31.1 G/DL — LOW (ref 32–37)
MCV RBC AUTO: 75.5 FL — LOW (ref 80–94)
MCV RBC AUTO: 76.1 FL — LOW (ref 80–94)
NRBC # BLD: 0 /100 WBCS — SIGNIFICANT CHANGE UP (ref 0–0)
NRBC # BLD: 0 /100 WBCS — SIGNIFICANT CHANGE UP (ref 0–0)
PHOSPHATE SERPL-MCNC: 2.6 MG/DL — SIGNIFICANT CHANGE UP (ref 2.1–4.9)
PHOSPHATE SERPL-MCNC: 2.6 MG/DL — SIGNIFICANT CHANGE UP (ref 2.1–4.9)
PLATELET # BLD AUTO: 173 K/UL — SIGNIFICANT CHANGE UP (ref 130–400)
PLATELET # BLD AUTO: 217 K/UL — SIGNIFICANT CHANGE UP (ref 130–400)
POTASSIUM SERPL-MCNC: 4.1 MMOL/L — SIGNIFICANT CHANGE UP (ref 3.5–5)
POTASSIUM SERPL-MCNC: 4.5 MMOL/L — SIGNIFICANT CHANGE UP (ref 3.5–5)
POTASSIUM SERPL-SCNC: 4.1 MMOL/L — SIGNIFICANT CHANGE UP (ref 3.5–5)
POTASSIUM SERPL-SCNC: 4.5 MMOL/L — SIGNIFICANT CHANGE UP (ref 3.5–5)
RBC # BLD: 4.81 M/UL — SIGNIFICANT CHANGE UP (ref 4.7–6.1)
RBC # BLD: 5.18 M/UL — SIGNIFICANT CHANGE UP (ref 4.7–6.1)
RBC # FLD: 14.6 % — HIGH (ref 11.5–14.5)
RBC # FLD: 14.6 % — HIGH (ref 11.5–14.5)
SODIUM SERPL-SCNC: 137 MMOL/L — SIGNIFICANT CHANGE UP (ref 135–146)
SODIUM SERPL-SCNC: 139 MMOL/L — SIGNIFICANT CHANGE UP (ref 135–146)
WBC # BLD: 10.19 K/UL — SIGNIFICANT CHANGE UP (ref 4.8–10.8)
WBC # BLD: 15.09 K/UL — HIGH (ref 4.8–10.8)
WBC # FLD AUTO: 10.19 K/UL — SIGNIFICANT CHANGE UP (ref 4.8–10.8)
WBC # FLD AUTO: 15.09 K/UL — HIGH (ref 4.8–10.8)

## 2020-10-07 PROCEDURE — 93010 ELECTROCARDIOGRAM REPORT: CPT

## 2020-10-07 RX ORDER — CHLORHEXIDINE GLUCONATE 213 G/1000ML
1 SOLUTION TOPICAL ONCE
Refills: 0 | Status: DISCONTINUED | OUTPATIENT
Start: 2020-10-07 | End: 2020-10-09

## 2020-10-07 RX ORDER — SODIUM CHLORIDE 9 MG/ML
1000 INJECTION, SOLUTION INTRAVENOUS
Refills: 0 | Status: DISCONTINUED | OUTPATIENT
Start: 2020-10-07 | End: 2020-10-08

## 2020-10-07 RX ORDER — ACETAMINOPHEN 500 MG
650 TABLET ORAL EVERY 6 HOURS
Refills: 0 | Status: DISCONTINUED | OUTPATIENT
Start: 2020-10-07 | End: 2020-10-09

## 2020-10-07 RX ORDER — BENZOYL PEROXIDE 50 MG/ML
1 GEL TOPICAL
Refills: 0 | Status: DISCONTINUED | OUTPATIENT
Start: 2020-10-07 | End: 2020-10-07

## 2020-10-07 RX ORDER — KETOROLAC TROMETHAMINE 30 MG/ML
15 SYRINGE (ML) INJECTION EVERY 6 HOURS
Refills: 0 | Status: DISCONTINUED | OUTPATIENT
Start: 2020-10-07 | End: 2020-10-08

## 2020-10-07 RX ORDER — BENZOCAINE 10 %
1 GEL (GRAM) MUCOUS MEMBRANE EVERY 4 HOURS
Refills: 0 | Status: DISCONTINUED | OUTPATIENT
Start: 2020-10-07 | End: 2020-10-09

## 2020-10-07 RX ADMIN — SODIUM CHLORIDE 75 MILLILITER(S): 9 INJECTION, SOLUTION INTRAVENOUS at 22:50

## 2020-10-07 RX ADMIN — HEPARIN SODIUM 5000 UNIT(S): 5000 INJECTION INTRAVENOUS; SUBCUTANEOUS at 05:14

## 2020-10-07 RX ADMIN — Medication 100 GRAM(S): at 05:15

## 2020-10-07 RX ADMIN — SODIUM CHLORIDE 75 MILLILITER(S): 9 INJECTION, SOLUTION INTRAVENOUS at 18:24

## 2020-10-07 RX ADMIN — Medication 15 MILLIGRAM(S): at 10:48

## 2020-10-07 RX ADMIN — Medication 650 MILLIGRAM(S): at 17:26

## 2020-10-07 RX ADMIN — Medication 650 MILLIGRAM(S): at 18:23

## 2020-10-07 RX ADMIN — Medication 650 MILLIGRAM(S): at 23:35

## 2020-10-07 RX ADMIN — HEPARIN SODIUM 5000 UNIT(S): 5000 INJECTION INTRAVENOUS; SUBCUTANEOUS at 22:51

## 2020-10-07 RX ADMIN — Medication 1 SPRAY(S): at 10:46

## 2020-10-07 RX ADMIN — SODIUM CHLORIDE 100 MILLILITER(S): 9 INJECTION, SOLUTION INTRAVENOUS at 11:04

## 2020-10-07 RX ADMIN — ONDANSETRON 4 MILLIGRAM(S): 8 TABLET, FILM COATED ORAL at 12:21

## 2020-10-07 RX ADMIN — PANTOPRAZOLE SODIUM 40 MILLIGRAM(S): 20 TABLET, DELAYED RELEASE ORAL at 12:27

## 2020-10-07 RX ADMIN — Medication 650 MILLIGRAM(S): at 13:25

## 2020-10-07 RX ADMIN — Medication 62.5 MILLIMOLE(S): at 15:10

## 2020-10-07 RX ADMIN — Medication 650 MILLIGRAM(S): at 12:26

## 2020-10-07 RX ADMIN — HEPARIN SODIUM 5000 UNIT(S): 5000 INJECTION INTRAVENOUS; SUBCUTANEOUS at 13:04

## 2020-10-07 RX ADMIN — Medication 15 MILLIGRAM(S): at 11:15

## 2020-10-07 NOTE — PROGRESS NOTE ADULT - SUBJECTIVE AND OBJECTIVE BOX
Procedure: Status post ex lap eun, take down of colostomy hand sewn anastomosis        Operative Findings:  · Operative Findings	ex-lap, EUN, identified rectal stump with EUN, takedown colostomy. TAP block ( 20 cc exparel + 30 cc marcaine + 100c NS).  colorectal anastomosis hand-sawn with 2/0 vicryl interrupted stitches.  hemostasis achieved          Post Operative day #1  Patient resting comfortably complaining of abdominal pain   Patient using pca pump making good urine output           pmh:  Diverticulitis    Block, transversus abdominis plane, bilateral    Closure, colostomy, Cruzito    S/P colon resection      No Known Allergies    cefoTEtan  IVPB 2 Gram(s) IV Intermittent every 12 hours  heparin   Injectable 5000 Unit(s) SubCutaneous every 8 hours  HYDROmorphone PCA (1 mG/mL) 30 milliLiter(s) PCA Continuous PCA Continuous  lactated ringers. 1000 milliLiter(s) IV Continuous <Continuous>  naloxone Injectable 0.1 milliGRAM(s) IV Push every 3 minutes PRN  ondansetron Injectable 4 milliGRAM(s) IV Push every 6 hours PRN  pantoprazole  Injectable 40 milliGRAM(s) IV Push daily          T(C): 36.9 (10-06-20 @ 20:00), Max: 37.4 (10-06-20 @ 14:50)  HR: 98 (10-06-20 @ 20:00) (87 - 103)  BP: 128/79 (10-06-20 @ 20:00) (113/74 - 150/83)  RR: 18 (10-06-20 @ 20:00) (4 - 18)  SpO2: 94% (10-06-20 @ 20:01) (94% - 99%)    10-06-20 @ 07:01  -  10-07-20 @ 00:51  --------------------------------------------------------  IN: 1175 mL / OUT: 895 mL / NET: 280 mL        General: Alert and oriented times 3, not in acute distress   Heart: Regular rate and rhythm, no rubs , murmurs or gallops  Lungs: Clear to auscultation bilaterally, no wheezes, rales, rhonci appreciated  Abdomen: Soft , positive bowel sounds, no peritoneal signs , midline incision clean dry intact, colostomy reversal incision cleand dry intact some swelling to incisions,  tenderness on palpation, surgical binder in place, rlq merissa drain in place with sero sanguinous outpt 5cc ,                13.0   16.63 )-----------( 202      ( 10-06 @ 15:53 )             41.8                    138   |  105   |  11                 Ca: 8.6    BMP:   ----------------------------< 168    M.6   (10-06-20 @ 15:53)             4.4    |  20    | 1.0                Ph: 3.6              PT/INR - ( 06 Oct 2020 15:53 )   PT: 12.20 sec;   INR: 1.06 ratio         PTT - ( 06 Oct 2020 15:53 )  PTT:23.9 sec    CARDIAC MARKERS ( 06 Oct 2020 15:53 )  x     / <0.01 ng/mL / 105 U/L / x     / 1.6 ng/mL

## 2020-10-07 NOTE — CONSULT NOTE ADULT - SUBJECTIVE AND OBJECTIVE BOX
HPI: 51 yo M admitted on 10/6 for closure of colostomy. Prior to hospitalization he was ambulating independent. Post - op rehab consulted for eval      PAST MEDICAL & SURGICAL HISTORY:  Diverticulitis    S/P colon resection  COLOSTOMY- RUPTURED DIVERTICULI 02/05/2020        Hospital Course:    TODAY'S SUBJECTIVE & REVIEW OF SYMPTOMS:     Constitutional WNL   Cardio WNL   Resp WNL   GI WNL  Heme WNL  Endo WNL  Skin WNL  MSK pain  Neuro WNL  Cognitive WNL  Psych WNL      MEDICATIONS  (STANDING):  acetaminophen   Tablet .. 650 milliGRAM(s) Oral every 6 hours  chlorhexidine 2% Cloths 1 Application(s) Topical once  dextrose 5% + sodium chloride 0.9%. 1000 milliLiter(s) (75 mL/Hr) IV Continuous <Continuous>  heparin   Injectable 5000 Unit(s) SubCutaneous every 8 hours  HYDROmorphone PCA (1 mG/mL) 30 milliLiter(s) PCA Continuous PCA Continuous  pantoprazole  Injectable 40 milliGRAM(s) IV Push daily  sodium phosphate IVPB 15 milliMole(s) IV Intermittent once    MEDICATIONS  (PRN):  benzocaine 20% Spray 1 Spray(s) Topical every 4 hours PRN throat pain  ketorolac   Injectable 15 milliGRAM(s) IV Push every 6 hours PRN Moderate Pain (4 - 6)  naloxone Injectable 0.1 milliGRAM(s) IV Push every 3 minutes PRN For ANY of the following changes in patient status:  A. RR LESS THAN 10 breaths per minute, B. Oxygen saturation LESS THAN 90%, C. Sedation score of 6  ondansetron Injectable 4 milliGRAM(s) IV Push every 6 hours PRN Nausea and/or Vomiting      FAMILY HISTORY:  No pertinent family history in first degree relatives        Allergies    No Known Allergies    Intolerances        SOCIAL HISTORY:    [  ] Etoh  [  ] Smoking  [  ] Substance abuse     Home Environment:  [  ] Home Alone  [x  ] Lives with Family  [  ] Home Health Aid    Dwelling:  [  ] Apartment  [ x ] Private House  [  ] Adult Home  [  ] Skilled Nursing Facility      [  ] Short Term  [  ] Long Term  [ x ] Stairs       Elevator [  ]    FUNCTIONAL STATUS PTA: (Check all that apply)  Ambulation: [  x ]Independent    [  ] Dependent     [  ] Non-Ambulatory  Assistive Device: [  ] SA Cane  [  ]  Q Cane  [  ] Walker  [  ]  Wheelchair  ADL : [x  ] Independent  [  ]  Dependent       Vital Signs Last 24 Hrs  T(C): 37.3 (07 Oct 2020 12:08), Max: 37.4 (06 Oct 2020 14:50)  T(F): 99.2 (07 Oct 2020 12:08), Max: 99.3 (06 Oct 2020 14:50)  HR: 104 (07 Oct 2020 12:08) (93 - 107)  BP: 123/82 (07 Oct 2020 12:08) (113/74 - 150/83)  BP(mean): --  RR: 18 (07 Oct 2020 12:08) (4 - 18)  SpO2: 94% (06 Oct 2020 20:01) (94% - 97%)      PHYSICAL EXAM: Awake & Alert  GENERAL: NAD  HEAD:  Normocephalic  CHEST/LUNG: Clear   HEART: S1S2+  ABDOMEN: Soft  EXTREMITIES:  no calf tenderness    NERVOUS SYSTEM:  Cranial Nerves 2-12 intact [  ] Abnormal  [  ]  ROM: WFL all extremities [ x ]  Abnormal [  ]  Motor Strength: WFL all extremities  [x  ]  Abnormal [  ]  Sensation: intact to light touch [x  ] Abnormal [  ]    FUNCTIONAL STATUS:  Bed Mobility: Independent [  ]  Supervision [  ]  Needs Assistance [ x ]  N/A [  ]  Transfers: Independent [  ]  Supervision [  ]  Needs Assistance [ x ]  N/A [  ]   Ambulation: Independent [  ]  Supervision [  ]  Needs Assistance [  ]  N/A [  ]  ADL: Independent [  ] Requires Assistance [  ] N/A [  ]      LABS:                        12.2   15.09 )-----------( 217      ( 07 Oct 2020 11:28 )             39.4     10-07    137  |  104  |  11  ----------------------------<  125<H>  4.5   |  22  |  1.1    Ca    8.9      07 Oct 2020 11:28  Phos  2.6     10-07  Mg     2.1     10-07      PT/INR - ( 06 Oct 2020 15:53 )   PT: 12.20 sec;   INR: 1.06 ratio         PTT - ( 06 Oct 2020 15:53 )  PTT:23.9 sec      RADIOLOGY & ADDITIONAL STUDIES:   Marlys Serrato  (RN)  2019 07:19:23

## 2020-10-07 NOTE — PROGRESS NOTE ADULT - ASSESSMENT
Strict i and os  Pain management dilaudid pca pump  NGT to low continuous suction   NPO  IVF LR@125  Keep abdominal binder in place  Surgery team to change dressing   Heparin subq   IV antibiotics   Zofran as needed  Protonix   Ambulate as tolerated encourage ambulation   Incentive spirometry

## 2020-10-07 NOTE — CONSULT NOTE ADULT - ASSESSMENT
IMPRESSION: Rehab of gait dysfunction     PRECAUTIONS: [  ] Cardiac  [  ] Respiratory  [  ] Seizures [  ] Contact Isolation  [  ] Droplet Isolation  [  ] Other    Weight Bearing Status:     RECOMMENDATION:    Out of Bed to Chair     DVT/Decubiti Prophylaxis    REHAB PLAN:     [ x  ] Bedside P/T 3-5 times a week   [   ]   Bedside O/T  2-3 times a week             [   ] No Rehab Therapy Indicated                   [   ]  Speech Therapy   Conditioning/ROM                                    ADL  Bed Mobility                                               Conditioning/ROM  Transfers                                                     Bed Mobility  Sitting /Standing Balance                         Transfers                                        Gait Training                                               Sitting/Standing Balance  Stair Training [   ]Applicable                    Home equipment Eval                                                                        Splinting  [   ] Only      GOALS:   ADL   [   ]   Independent                    Transfers  [ x  ] Independent                          Ambulation  [ x  ] Independent     [  x  ] With device                            [   ]  CG                                                         [   ]  CG                                                                  [   ] CG                            [    ] Min A                                                   [   ] Min A                                                              [   ] Min  A          DISCHARGE PLAN:   [   ]  Good candidate for Intensive Rehabilitation/Hospital based                                             Will tolerate 3hrs Intensive Rehab Daily                                       [    ]  Short Term Rehab in Skilled Nursing Facility                                       [ x   ]  Home with Outpatient or  services                                         [    ]  Possible Candidate for Intensive Hospital based Rehab

## 2020-10-08 LAB
ANION GAP SERPL CALC-SCNC: 9 MMOL/L — SIGNIFICANT CHANGE UP (ref 7–14)
BUN SERPL-MCNC: 8 MG/DL — LOW (ref 10–20)
CALCIUM SERPL-MCNC: 8.8 MG/DL — SIGNIFICANT CHANGE UP (ref 8.5–10.1)
CHLORIDE SERPL-SCNC: 106 MMOL/L — SIGNIFICANT CHANGE UP (ref 98–110)
CO2 SERPL-SCNC: 25 MMOL/L — SIGNIFICANT CHANGE UP (ref 17–32)
CREAT SERPL-MCNC: 0.8 MG/DL — SIGNIFICANT CHANGE UP (ref 0.7–1.5)
GLUCOSE SERPL-MCNC: 93 MG/DL — SIGNIFICANT CHANGE UP (ref 70–99)
HCT VFR BLD CALC: 34.9 % — LOW (ref 42–52)
HGB BLD-MCNC: 10.8 G/DL — LOW (ref 14–18)
MAGNESIUM SERPL-MCNC: 2.2 MG/DL — SIGNIFICANT CHANGE UP (ref 1.8–2.4)
MCHC RBC-ENTMCNC: 23.6 PG — LOW (ref 27–31)
MCHC RBC-ENTMCNC: 30.9 G/DL — LOW (ref 32–37)
MCV RBC AUTO: 76.4 FL — LOW (ref 80–94)
NRBC # BLD: 0 /100 WBCS — SIGNIFICANT CHANGE UP (ref 0–0)
PHOSPHATE SERPL-MCNC: 2.5 MG/DL — SIGNIFICANT CHANGE UP (ref 2.1–4.9)
PLATELET # BLD AUTO: 186 K/UL — SIGNIFICANT CHANGE UP (ref 130–400)
POTASSIUM SERPL-MCNC: 4.3 MMOL/L — SIGNIFICANT CHANGE UP (ref 3.5–5)
POTASSIUM SERPL-SCNC: 4.3 MMOL/L — SIGNIFICANT CHANGE UP (ref 3.5–5)
RBC # BLD: 4.57 M/UL — LOW (ref 4.7–6.1)
RBC # FLD: 14.8 % — HIGH (ref 11.5–14.5)
SODIUM SERPL-SCNC: 140 MMOL/L — SIGNIFICANT CHANGE UP (ref 135–146)
SURGICAL PATHOLOGY STUDY: SIGNIFICANT CHANGE UP
WBC # BLD: 10.5 K/UL — SIGNIFICANT CHANGE UP (ref 4.8–10.8)
WBC # FLD AUTO: 10.5 K/UL — SIGNIFICANT CHANGE UP (ref 4.8–10.8)

## 2020-10-08 RX ORDER — OXYCODONE HYDROCHLORIDE 5 MG/1
5 TABLET ORAL EVERY 6 HOURS
Refills: 0 | Status: DISCONTINUED | OUTPATIENT
Start: 2020-10-08 | End: 2020-10-09

## 2020-10-08 RX ORDER — IBUPROFEN 200 MG
600 TABLET ORAL EVERY 6 HOURS
Refills: 0 | Status: DISCONTINUED | OUTPATIENT
Start: 2020-10-08 | End: 2020-10-09

## 2020-10-08 RX ORDER — PANTOPRAZOLE SODIUM 20 MG/1
40 TABLET, DELAYED RELEASE ORAL
Refills: 0 | Status: DISCONTINUED | OUTPATIENT
Start: 2020-10-08 | End: 2020-10-09

## 2020-10-08 RX ADMIN — HEPARIN SODIUM 5000 UNIT(S): 5000 INJECTION INTRAVENOUS; SUBCUTANEOUS at 13:16

## 2020-10-08 RX ADMIN — HEPARIN SODIUM 5000 UNIT(S): 5000 INJECTION INTRAVENOUS; SUBCUTANEOUS at 06:15

## 2020-10-08 RX ADMIN — Medication 600 MILLIGRAM(S): at 17:02

## 2020-10-08 RX ADMIN — Medication 650 MILLIGRAM(S): at 23:01

## 2020-10-08 RX ADMIN — Medication 600 MILLIGRAM(S): at 11:06

## 2020-10-08 RX ADMIN — Medication 650 MILLIGRAM(S): at 11:06

## 2020-10-08 RX ADMIN — Medication 650 MILLIGRAM(S): at 17:02

## 2020-10-08 RX ADMIN — Medication 600 MILLIGRAM(S): at 23:01

## 2020-10-08 RX ADMIN — Medication 600 MILLIGRAM(S): at 11:36

## 2020-10-08 RX ADMIN — Medication 650 MILLIGRAM(S): at 06:15

## 2020-10-08 RX ADMIN — Medication 650 MILLIGRAM(S): at 17:30

## 2020-10-08 RX ADMIN — Medication 650 MILLIGRAM(S): at 11:36

## 2020-10-08 RX ADMIN — Medication 650 MILLIGRAM(S): at 23:45

## 2020-10-08 RX ADMIN — Medication 600 MILLIGRAM(S): at 23:45

## 2020-10-08 RX ADMIN — Medication 85 MILLIMOLE(S): at 06:46

## 2020-10-08 RX ADMIN — HEPARIN SODIUM 5000 UNIT(S): 5000 INJECTION INTRAVENOUS; SUBCUTANEOUS at 23:01

## 2020-10-08 RX ADMIN — Medication 600 MILLIGRAM(S): at 17:30

## 2020-10-08 RX ADMIN — PANTOPRAZOLE SODIUM 40 MILLIGRAM(S): 20 TABLET, DELAYED RELEASE ORAL at 11:06

## 2020-10-08 NOTE — PROGRESS NOTE ADULT - SUBJECTIVE AND OBJECTIVE BOX
Hospital Day: 3  Post Operative Day:2  Procedure:s/p ex lap reversal of colostomy   Patient is a 50y old  Male who presents with a chief complaint of   PAST MEDICAL & SURGICAL HISTORY:  Diverticulitis    S/P colon resection  COLOSTOMY- RUPTURED DIVERTICULI 2020        Events of the Last 24h:NGT removed , hard candies , sips and chips tolerating, improving pain , walked around      Vital Signs Last 24 Hrs  T(C): 37.3 (07 Oct 2020 20:27), Max: 37.3 (07 Oct 2020 12:08)  T(F): 99.1 (07 Oct 2020 20:27), Max: 99.2 (07 Oct 2020 12:08)  HR: 92 (07 Oct 2020 20:27) (92 - 107)  BP: 113/66 (07 Oct 2020 20:27) (102/64 - 125/83)  BP(mean): --  RR: 18 (07 Oct 2020 20:27) (18 - 18)  SpO2: --        Diet, NPO:   Except Medications  With Hard Candy  With Ice Chips/Sips of Water (10-07-20 @ 10:40)      I&O's Summary    06 Oct 2020 07:  -  07 Oct 2020 07:00  --------------------------------------------------------  IN: 2225 mL / OUT: 1870 mL / NET: 355 mL    07 Oct 2020 07:01  -  08 Oct 2020 01:13  --------------------------------------------------------  IN: 0 mL / OUT: 1160 mL / NET: -1160 mL     I&O's Detail    06 Oct 2020 07:01  -  07 Oct 2020 07:00  --------------------------------------------------------  IN:    IV PiggyBack: 100 mL    Lactated Ringers: 2125 mL  Total IN: 2225 mL    OUT:    Drain (mL): 50 mL    Indwelling Catheter - Urethral (mL): 1650 mL    Nasogastric/Oral tube (mL): 170 mL  Total OUT: 1870 mL    Total NET: 355 mL      07 Oct 2020 07:01  -  08 Oct 2020 01:13  --------------------------------------------------------  IN:  Total IN: 0 mL    OUT:    Drain (mL): 10 mL    Indwelling Catheter - Urethral (mL): 1150 mL    Oral Fluid: 0 mL  Total OUT: 1160 mL    Total NET: -1160 mL          MEDICATIONS  (STANDING):  acetaminophen   Tablet .. 650 milliGRAM(s) Oral every 6 hours  chlorhexidine 2% Cloths 1 Application(s) Topical once  dextrose 5% + sodium chloride 0.9%. 1000 milliLiter(s) (75 mL/Hr) IV Continuous <Continuous>  heparin   Injectable 5000 Unit(s) SubCutaneous every 8 hours  HYDROmorphone PCA (1 mG/mL) 30 milliLiter(s) PCA Continuous PCA Continuous  pantoprazole  Injectable 40 milliGRAM(s) IV Push daily    MEDICATIONS  (PRN):  benzocaine 20% Spray 1 Spray(s) Topical every 4 hours PRN throat pain  ketorolac   Injectable 15 milliGRAM(s) IV Push every 6 hours PRN Moderate Pain (4 - 6)  naloxone Injectable 0.1 milliGRAM(s) IV Push every 3 minutes PRN For ANY of the following changes in patient status:  A. RR LESS THAN 10 breaths per minute, B. Oxygen saturation LESS THAN 90%, C. Sedation score of 6  ondansetron Injectable 4 milliGRAM(s) IV Push every 6 hours PRN Nausea and/or Vomiting      PHYSICAL EXAM:    GENERAL: NAD    HEENT: NCAT    CHEST/LUNGS: CTAB    HEART: RRR,  No murmurs, rubs, or gallops    ABDOMEN: SNTND +BS, midline incision clean dry intact , merissa drain in place sero sanguinous abdominal binder in place     EXTREMITIES:  FROM, No clubbing, cyanosis, or edema, palpable pulse    NEURO: No focal neurological deficits    SKIN: No rashes or lesions    INCISION/WOUNDS:                          11.3   10.19 )-----------( 173      ( 07 Oct 2020 21:45 )             36.3        CBC Full  -  ( 07 Oct 2020 21:45 )  WBC Count : 10.19 K/uL  RBC Count : 4.81 M/uL  Hemoglobin : 11.3 g/dL  Hematocrit : 36.3 %  Platelet Count - Automated : 173 K/uL  Mean Cell Volume : 75.5 fL  Mean Cell Hemoglobin : 23.5 pg  Mean Cell Hemoglobin Concentration : 31.1 g/dL  Auto Neutrophil # : x  Auto Lymphocyte # : x  Auto Monocyte # : x  Auto Eosinophil # : x  Auto Basophil # : x  Auto Neutrophil % : x  Auto Lymphocyte % : x  Auto Monocyte % : x  Auto Eosinophil % : x  Auto Basophil % : x               139   |  105   |  11                 Ca: 8.3    BMP:   ----------------------------< 112    M.2   (10-07-20 @ 21:45)             4.1    |  23    | 1.0                Ph: 2.6          PT/INR - ( 06 Oct 2020 15:53 )   PT: 12.20 sec;   INR: 1.06 ratio         PTT - ( 06 Oct 2020 15:53 )  PTT:23.9 sec  CARDIAC MARKERS ( 06 Oct 2020 15:53 )  x     / <0.01 ng/mL / 105 U/L / x     / 1.6 ng/mL

## 2020-10-08 NOTE — PROGRESS NOTE ADULT - ASSESSMENT
Pain management PCA pump  NPO- can have sips &chips hard candy   GI/DVT prophylaxis   Maintenance fluids D5 1/2NS @75  Strict I's & O's  Barcenas in place  Keep abdominal binder in place  Daily dressing changes surgery team   Encourage ambulation   Incentive spirometry Pain management PCA pump  NPO- can have sips &chips hard candy   GI/DVT prophylaxis   Maintenance fluids D5 1/2NS @75  Strict I's & O's  Barcenas in place  Keep abdominal binder in place  Daily dressing changes- surgery team   Encourage ambulation   Incentive spirometry  Monitor for bowel function

## 2020-10-08 NOTE — CHART NOTE - NSCHARTNOTEFT_GEN_A_CORE
Wound packed daily on rounds.    Instructions for packing: There is a piece of paper tape and gauze covering the packed wound in the LLQ of the abdomen. Remove tape and gauze. Moisten the old dry packing with saline flush to alleviate pain on removal. Remove gauze. Next repack wound with 1/2 in plain sterile strip gauze. Cover with 4x4 gauze, and then a piece of paper tape. Furthermore, use chlorhexidine swab to clean around the midline incision with staples. Put abdominal binder back in place afterwards.

## 2020-10-09 ENCOUNTER — TRANSCRIPTION ENCOUNTER (OUTPATIENT)
Age: 50
End: 2020-10-09

## 2020-10-09 VITALS — RESPIRATION RATE: 18 BRPM | DIASTOLIC BLOOD PRESSURE: 70 MMHG | SYSTOLIC BLOOD PRESSURE: 113 MMHG | HEART RATE: 72 BPM

## 2020-10-09 RX ORDER — POTASSIUM PHOSPHATE, MONOBASIC POTASSIUM PHOSPHATE, DIBASIC 236; 224 MG/ML; MG/ML
15 INJECTION, SOLUTION INTRAVENOUS ONCE
Refills: 0 | Status: DISCONTINUED | OUTPATIENT
Start: 2020-10-09 | End: 2020-10-09

## 2020-10-09 RX ORDER — ACETAMINOPHEN 500 MG
2 TABLET ORAL
Qty: 0 | Refills: 0 | DISCHARGE
Start: 2020-10-09

## 2020-10-09 RX ORDER — IBUPROFEN 200 MG
1 TABLET ORAL
Qty: 0 | Refills: 0 | DISCHARGE
Start: 2020-10-09

## 2020-10-09 RX ADMIN — Medication 600 MILLIGRAM(S): at 05:04

## 2020-10-09 RX ADMIN — Medication 650 MILLIGRAM(S): at 06:00

## 2020-10-09 RX ADMIN — Medication 650 MILLIGRAM(S): at 12:00

## 2020-10-09 RX ADMIN — Medication 600 MILLIGRAM(S): at 06:00

## 2020-10-09 RX ADMIN — Medication 650 MILLIGRAM(S): at 11:29

## 2020-10-09 RX ADMIN — Medication 600 MILLIGRAM(S): at 11:29

## 2020-10-09 RX ADMIN — Medication 650 MILLIGRAM(S): at 05:04

## 2020-10-09 RX ADMIN — Medication 85 MILLIMOLE(S): at 02:09

## 2020-10-09 RX ADMIN — HEPARIN SODIUM 5000 UNIT(S): 5000 INJECTION INTRAVENOUS; SUBCUTANEOUS at 05:04

## 2020-10-09 RX ADMIN — Medication 600 MILLIGRAM(S): at 12:00

## 2020-10-09 NOTE — DISCHARGE NOTE PROVIDER - NSDCFUSCHEDAPPT_GEN_ALL_CORE_FT
ROMAIN SMITH ; 10/15/2020 ; [facility] [address] ROMAIN Paniagua ; 11/09/2020 ; [facility] [address]

## 2020-10-09 NOTE — DISCHARGE NOTE PROVIDER - CARE PROVIDER_API CALL
Be Magdaleno  SURGERY  [address] [place] [address], [address] [place] [address]  Phone: [phone]  Fax: [phone]  Follow Up Time: 2 weeks

## 2020-10-09 NOTE — DISCHARGE NOTE PROVIDER - NSDCCPCAREPLAN_GEN_ALL_CORE_FT
PRINCIPAL DISCHARGE DIAGNOSIS  Diagnosis: History of diverticulitis  Assessment and Plan of Treatment:       SECONDARY DISCHARGE DIAGNOSES  Diagnosis: S/P colostomy takedown  Assessment and Plan of Treatment:

## 2020-10-09 NOTE — DISCHARGE NOTE NURSING/CASE MANAGEMENT/SOCIAL WORK - PATIENT PORTAL LINK FT
You can access the FollowMyHealth Patient Portal offered by [facility] by registering at the following website: [email or web address]. By joining [employer]’s FollowMyHealth portal, you will also be able to view your health information using other applications (apps) compatible with our system.

## 2020-10-09 NOTE — CDI QUERY NOTE - NSCDIOTHERTXTBX_GEN_ALL_CORE_HH
50 M, colostomy take down  Clinical Indicator:  Mg=1.6 (10/06/2020), 2.1 (10/7/2020)  Meds:  mgso4 2gm IV (10/06/2020)     Based on your professional judgment and clinical indicators, please indicate the diagnosis that best reflects the clinical picture.    [ ] Hypomagnesemia  [ ] Other (please specify)  [ ] Unable to clinically determine    Thank you.                                                                                                                                                      Select which option you would like to pursue.  [ ] Write a statement to support the clinical diagnosis chosen above.  [ ] I will add an addendum to the medical record.  I will document the clinical diagnosis and justification for that clinical diagnosis in the medical documentation.    Disclaimer:  In responding to this request, please exercise your independent professional judgment. The fact that a question is asked does not imply that any particular answer is desired or expected. Documentation clarification is required for compliance, accuracy in coding and billing, claim validation and reporting severity of illness, quality data, and risk of mortality.

## 2020-10-09 NOTE — PROGRESS NOTE ADULT - PROVIDER SPECIALTY LIST ADULT
Surgery
DVT ppx: coumadin  diet: regular, as tolerated  dispo: home today    Umberto Huerta MD  Internal Medicine PGY-1  Pager: [phone]/ [phone]  After 7 PM on weekdays and 12 PM on weekends page [phone]
DVT ppx: coumadin  diet: regular, as tolerated  dispo: home following heme consult    Umberto Huerta MD  Internal Medicine PGY-1  Pager: [phone]/ [phone]  After 7 PM on weekdays and 12 PM on weekends page [phone]

## 2020-10-09 NOTE — PROGRESS NOTE ADULT - SUBJECTIVE AND OBJECTIVE BOX
GENERAL SURGERY PROGRESS NOTE     ROMAIN SMITH  50y  Male  Hospital day :3d  POD:  Procedure: Block, transversus abdominis plane, bilateral    Closure, colostomy, Cruzito      EVENTS IN THE LAST 24 HRS: Patient refers feeling well, tolerating clear diet, ambulating, voiding, states improvement of abdominal pain, no passing gas, no bowel movement, MT output 12cc serous      T(F): 98.1 (10-08-20 @ 21:13), Max: 99.3 (10-08-20 @ 01:15)  HR: 83 (10-08-20 @ 21:13) (78 - 97)  BP: 118/68 (10-08-20 @ 21:13) (110/68 - 123/77)  RR: 18 (10-08-20 @ 21:13) (18 - 18)    PHYSICAL EXAM:  GENERAL: NAD,   CHEST/LUNG: Clear to auscultation bilaterally  HEART: Regular rate and rhythm  ABDOMEN: Soft, Nontender, Nondistended; Surgical wound dry and clean, ostomy wound with packing, MT 12 cc serous  EXTREMITIES:  No clubbing, cyanosis, or edema      DIET/FLUIDS:   NG:                                                                                DRAINS:   10-07-20 @ 07:01  -  10-08-20 @ 07:00  --------------------------------------------------------  OUT: 15 mL      BM:     EMESIS:     URINE:   10-07-20 @ 07:01  -  10-08-20 @ 07:00  --------------------------------------------------------  OUT: 1550 mL       GI proph:  pantoprazole    Tablet 40 milliGRAM(s) Oral before breakfast    AC/ proph: heparin   Injectable 5000 Unit(s) SubCutaneous every 8 hours    ABx:         LABS  Labs:  CAPILLARY BLOOD GLUCOSE                              10.8   10.50 )-----------( 186      ( 08 Oct 2020 21:58 )             34.9         10-08    140  |  106  |  8<L>  ----------------------------<  93  4.3   |  25  |  0.8      Calcium, Total Serum: 8.8 mg/dL (10-08-20 @ 21:58)

## 2020-10-09 NOTE — PROGRESS NOTE ADULT - ATTENDING COMMENTS
Pt seen with house staff @ 1030 hrs, alert and stable, c/o incisional pain.  No fevers, very good u/o overnite, NG drainage and MT output noted, MT is sero-sang.  Abd soft and not distended, dressings dry and intact.  Legs soft.  Post-op labs noted, pt rec'd addna'l Mg.    4-hr NGT residual was about 40-50 cc.    IMP:  No p.o. problems noted, NGT removed.  PLAN:, reduce IVF rate, re-check labs            OOB, incentive spirometer.            Cont DVT prophylaxis and d/c IV axbx after 2 p.o. doses.            NPO today except ice chips/ hard candy.            Plan to remove perea in am tomorrow, dressing changes tomorrow  Operative findings and Rx were d/w pt and wife.
Patient seen and examined with house staff at 0900 hours.  he is alert, stable, and cheerful.  Complains of minimal abdominal discomfort, tolerating p.o. feeds, voiding well without Barcenas catheter, passing flatus but no bowel movement.    He remains stable and afebrile. Abdomen soft and not distended. Wounds are clean and dry, left lower quadrant packing in place. Drain removed this morning by house staff. Leg soft.    Labs noted.    Good progress overall. The patient will likely be discharged this afternoon as long as he does well with lunch and there were no other problems. Full wound care diet and activity instructions were reviewed, arrangements for home care nursing in progress. All his questions were answered and he will followup with me in the office within the next one to 2 weeks, will followup pathology as an outpatient.
Patient seen with surgical resident at 0900 hrs. He looks and feels much better today, with less pain, no nausea, but no bowel movement or flatus. He is out of bed walking.  Urine output and drain output noted, the MT drain fluid is more serous.  Abdomen soft and not distended with a few bowel sounds heard. Midline wound clean colostomy site dressings intact with packing in place and small amount of serosanguineous drainage. Leg soft.  Yesterday's labs noted, patient receiving phosphate replacement.    Plan: Start clear fluids, switched to oral analgesics, remove Barcenas catheter and check to void.           Continue ambulation and DVT prophylaxis.           Dietary to see patient regarding a low residue diet at home, also need home care nursing regarding the colostomy site wound care.           If he continues to do well, plan for regular low-residue diet starting tomorrow and then discharge her tomorrow evening or Saturday if no problems were noted. We'll plan to remove his MT drain prior to discharge.

## 2020-10-09 NOTE — DISCHARGE NOTE PROVIDER - NSDCCPTREATMENT_GEN_ALL_CORE_FT
PRINCIPAL PROCEDURE  Procedure: Closure, colostomy, Cruzito  Findings and Treatment:       SECONDARY PROCEDURE  Procedure: Block, transversus abdominis plane, bilateral  Findings and Treatment:

## 2020-10-09 NOTE — DISCHARGE NOTE PROVIDER - HOSPITAL COURSE
50 year old male admitted in past with complicated diverticulitis requiring Hartmanns procedure.  Patient now admitted after elective exploratory laparotomy and reversal of colostomy on 10/6/20.  Post-operatively, the patient had no complications, diet was slowly advanced, perea removed and daily packing   changes to the old ostomy site. On POD 3, patient was ambulating, drain was removed and patient was  tolerating low residue diet. Patient was discharged home with VNS for wound care, and was told to  follow up with Dr. Magdaleno in the office.

## 2020-10-09 NOTE — DISCHARGE NOTE PROVIDER - NSDCMRMEDTOKEN_GEN_ALL_CORE_FT
acetaminophen 325 mg oral tablet: 2 tab(s) orally every 6 hours  ibuprofen 600 mg oral tablet: 1 tab(s) orally every 6 hours  multivitamin: Men&#x27;s Alive

## 2020-10-15 ENCOUNTER — APPOINTMENT (OUTPATIENT)
Dept: SURGERY | Facility: CLINIC | Age: 50
End: 2020-10-15
Payer: COMMERCIAL

## 2020-10-15 VITALS
WEIGHT: 180 LBS | DIASTOLIC BLOOD PRESSURE: 80 MMHG | TEMPERATURE: 97.6 F | BODY MASS INDEX: 27.28 KG/M2 | HEIGHT: 68 IN | SYSTOLIC BLOOD PRESSURE: 120 MMHG | HEART RATE: 91 BPM

## 2020-10-15 DIAGNOSIS — K66.0 PERITONEAL ADHESIONS (POSTPROCEDURAL) (POSTINFECTION): ICD-10-CM

## 2020-10-15 DIAGNOSIS — K57.32 DIVERTICULITIS OF LARGE INTESTINE WITHOUT PERFORATION OR ABSCESS WITHOUT BLEEDING: ICD-10-CM

## 2020-10-15 DIAGNOSIS — Z43.3 ENCOUNTER FOR ATTENTION TO COLOSTOMY: ICD-10-CM

## 2020-10-15 DIAGNOSIS — E83.42 HYPOMAGNESEMIA: ICD-10-CM

## 2020-10-15 PROCEDURE — 99024 POSTOP FOLLOW-UP VISIT: CPT

## 2020-10-15 NOTE — HISTORY OF PRESENT ILLNESS
[de-identified] : The patient is now 9 days status post exploratory laparotomy with lysis of adhesions and closure of colostomy. He looks and feels well, has a good appetite, and has no specific complaints. He has had no fevers or chills, voids normally, and has 2-3 soft bowel movements per day on a low fiber diet. Open wound care for the colostomy site in the left lower quadrant is via visiting nurses. He is doing well with over-the-counter analgesics.

## 2020-10-15 NOTE — PHYSICAL EXAM
[Normal Breath Sounds] : Normal breath sounds [Normal Rate and Rhythm] : normal rate and rhythm [Normal Heart Sounds] : normal heart sounds [Abdominal Masses] : No abdominal masses [Abdomen Tenderness] : ~T ~M No abdominal tenderness [No HSM] : no hepatosplenomegaly [de-identified] : healthy [de-identified] : Soft and not distended, midline wound and right lower quadrant drain site are clean and dry. Colostomy site packing in the left lower quadrant was removed. The open areas of the wound are clean and granulated. The wound was cleaned with peroxide and Q-tips, new packing was placed and a dry dressing was applied. The midline wound staples were removed and Steri-Strips were placed.

## 2020-10-15 NOTE — ASSESSMENT
[FreeTextEntry1] : Satisfactory recovery so far, no postoperative problems are noted. Local care and activity instructions were reviewed. He was given a prescription for Aquacel rope as per the visiting nurse for the left lower quadrant wound. He is scheduled to see his primary care physician next week, which was encouraged. At 3 weeks after surgery he can start to add fiber to his diet.  All his questions were answered and he understands and agrees, he will proceed as outlined above.

## 2020-10-29 ENCOUNTER — APPOINTMENT (OUTPATIENT)
Dept: SURGERY | Facility: CLINIC | Age: 50
End: 2020-10-29
Payer: COMMERCIAL

## 2020-10-29 VITALS
HEIGHT: 68 IN | SYSTOLIC BLOOD PRESSURE: 122 MMHG | WEIGHT: 179 LBS | HEART RATE: 97 BPM | BODY MASS INDEX: 27.13 KG/M2 | DIASTOLIC BLOOD PRESSURE: 78 MMHG | TEMPERATURE: 97.3 F

## 2020-10-29 PROCEDURE — 99024 POSTOP FOLLOW-UP VISIT: CPT

## 2020-10-29 NOTE — ASSESSMENT
[FreeTextEntry1] : Satisfactory postoperative progress with no significant problems noted. We reviewed his activity, wound care and diet and he can start to add fiber to to the diet at this time. He will continue wound care with visiting nurses and return here in about 2-3 weeks for reevaluation as already scheduled.

## 2020-10-29 NOTE — PHYSICAL EXAM
[Normal Breath Sounds] : Normal breath sounds [Normal Heart Sounds] : normal heart sounds [de-identified] : healthy and in no distress [de-identified] : Soft and not distended. Midline wound continues to heal without problems, small open area near the lower end with scant serous discharge was cleaned and dressed. The colostomy site is clean and granulating, the wound was probed with Q-tips and there are no undrained collections. The wound was repacked and redressed

## 2020-10-29 NOTE — HISTORY OF PRESENT ILLNESS
[de-identified] : The patient returns for reevaluation and looks well. He has some right-sided abdominal discomfort, but his appetite, bowel and bladder function are all satisfactory. He has had no fevers or chills and he continues to have daily open wound care via visiting nurses

## 2020-11-09 ENCOUNTER — APPOINTMENT (OUTPATIENT)
Dept: CARDIOLOGY | Facility: CLINIC | Age: 50
End: 2020-11-09

## 2020-11-09 ENCOUNTER — APPOINTMENT (OUTPATIENT)
Dept: SURGERY | Facility: CLINIC | Age: 50
End: 2020-11-09
Payer: COMMERCIAL

## 2020-11-09 VITALS
WEIGHT: 182 LBS | TEMPERATURE: 97.8 F | HEIGHT: 68 IN | SYSTOLIC BLOOD PRESSURE: 112 MMHG | BODY MASS INDEX: 27.58 KG/M2 | HEART RATE: 83 BPM | DIASTOLIC BLOOD PRESSURE: 70 MMHG

## 2020-11-09 PROCEDURE — 99024 POSTOP FOLLOW-UP VISIT: CPT

## 2020-11-09 NOTE — PHYSICAL EXAM
[de-identified] : Soft and not distended. Hypertrophic granulation tissue at the colostomy site was treated with silver nitrate and dressed. 5-6 cm area of erythema and induration at the midline incision below the umbilicus. A central fluctuant area was easily opened and a drop of pus was released. There was a small subcutaneous cavity which was cleaned with peroxide and saline and then packed open. Gentle probing did not reveal any communication to the level of the fascia. A dry dressing was applied and the patient tolerated this well. Full local care instructions were reviewed and he will resume with the visiting nurse.

## 2020-11-09 NOTE — PLAN
[FreeTextEntry1] : The patient will start on Augmentin 875 mg twice daily for 10 days and return here in about 10-14 days for reevaluation, or sooner as needed. All his questions were answered and he is happy with the assessment and plan.

## 2020-11-09 NOTE — HISTORY OF PRESENT ILLNESS
[de-identified] : The patient returns for reevaluation of his abdominal wound. He continues to feel well and is anxious to get back to work.  His appetite, bowel and bladder function are normal. He notes that the colostomy site is much smaller. He notes increased swelling in the midline wound and was referred back here by his visiting nurse. He has had no fevers, chills, or increased abdominal pain.

## 2020-11-19 ENCOUNTER — APPOINTMENT (OUTPATIENT)
Dept: SURGERY | Facility: CLINIC | Age: 50
End: 2020-11-19
Payer: COMMERCIAL

## 2020-11-19 VITALS
SYSTOLIC BLOOD PRESSURE: 102 MMHG | HEIGHT: 68 IN | WEIGHT: 184 LBS | TEMPERATURE: 97.3 F | DIASTOLIC BLOOD PRESSURE: 70 MMHG | HEART RATE: 82 BPM | BODY MASS INDEX: 27.89 KG/M2

## 2020-11-19 DIAGNOSIS — K57.20 DIVERTICULITIS OF LARGE INTESTINE WITH PERFORATION AND ABSCESS W/OUT BLEEDING: ICD-10-CM

## 2020-11-19 DIAGNOSIS — T81.49XA INFECTION FOLLOWING A PROCEDURE, OTHER SURGICAL SITE, INITIAL ENCOUNTER: ICD-10-CM

## 2020-11-19 PROCEDURE — 99024 POSTOP FOLLOW-UP VISIT: CPT

## 2020-11-19 NOTE — ASSESSMENT
[FreeTextEntry1] : Satisfactory postoperative progress. The patient should avoid any weight gain try to reduce his risk of an incisional hernia. He can use warm compresses and tub baths the Diaz indurated area, which should continue to resolve slowly over time. He can also start to introduce more fiber into his diet. He can return to work next week as it will be 6 weeks since his surgery but he should refrain from any significant heavy lifting or straining for the above reasons. All his questions were answered and he understands and agrees, he will return here in about 6 weeks for reevaluation.

## 2020-11-19 NOTE — PHYSICAL EXAM
[Normal Breath Sounds] : Normal breath sounds [Normal Heart Sounds] : normal heart sounds [Abdominal Masses] : No abdominal masses [Abdomen Tenderness] : ~T ~M No abdominal tenderness [No HSM] : no hepatosplenomegaly [de-identified] : healthy and in no distress [de-identified] : Soft and not distended. Both the midline and left lower quadrant incisions are clean and closed. There is minimal local erythema and induration around the prior drainage site along the lower aspect of the midline incision, but there is no fluctuance, crepitance, or tenderness here. Abdominal examination is otherwise unremarkable and no hernias are noted.

## 2020-11-19 NOTE — HISTORY OF PRESENT ILLNESS
[de-identified] : The patient returns for continued postop followup. He will be finishing his oral antibiotics in 2 days and feels much better overall. His abdominal pain has almost completely resolved, his appetite is satisfactory, and his bowel and bladder function are normal. Current weight is 183 pounds.

## 2021-01-04 ENCOUNTER — APPOINTMENT (OUTPATIENT)
Dept: SURGERY | Facility: CLINIC | Age: 51
End: 2021-01-04

## 2021-03-08 NOTE — ED ADULT TRIAGE NOTE - CHIEF COMPLAINT QUOTE
Patient C/O abdominal pain, weakness, chills, burning while urinating.
What Type Of Note Output Would You Prefer (Optional)?: Standard Output
Hpi Title: Evaluation of Skin Lesions
How Severe Are Your Spot(S)?: moderate
Have Your Spot(S) Been Treated In The Past?: has not been treated

## 2021-04-10 NOTE — H&P ADULT - ASSESSMENT
OFFICE VISIT      Patient: Ismael Pereira Date of Service: 4/10/2021   : 1989 MRN: [id number]     VACCINATION     Chief Complaint   Patient presents with   • Office Visit   • Vaccination       No current outpatient medications on file.     No current facility-administered medications for this visit.       ALLERGIES:  No Known Allergies    Immunization History   Administered Date(s) Administered   • DTaP, 5 Pertussis Antigens (DAPTACEL) 1990, 1990, 1990, 10/03/1991, 1995   • HIB (HbOC) 1990, 03/15/1991   • Hep B, Unspecified Formulation 1998, 10/13/1998, 1999   • MMR 1990, 1995   • PPD 1990   • Polio, INACTIVATED 1990, 1990, 10/03/1991, 1995   • TD Adult, Adsorbed 2004   • Tdap 04/10/2021       Visit Vitals  Temp 98.7 °F (37.1 °C) (Temporal)       Problem List Items Addressed This Visit     None      Visit Diagnoses     Need for vaccination    -  Primary    Relevant Orders    TETANUS DIPHTHERIA ACELLULAR PERTUSSIS VACC, 10+ YRS (BOOSTRIX) (Completed)          Follow up:  It is important to follow up with your PCP for your annual preventative wellness exam.     Even though he declined \"service to family practice\" today, impressed on him to reconsider and to get established with a PCP to help co-ordinate his care.    Patient Instructions provided as documented in the AVS.    The patient indicated understanding of the diagnosis and agreed with the plan of care.      Corrine Anderson CNP             Patient is 49M s/p Cruzito's procedure for perforated sigmoid diverticulitis who presents for preoperative colonoscopy prior to colostomy closure.  No personal history of polyps or family history of colorectal cancer    for colonoscopy

## 2021-08-20 NOTE — ED ADULT TRIAGE NOTE - HEIGHT IN FEET
Reduce the hctz to one half pill daily     reduce snacks to reverse weight loss     [email or web address]  For next Moderna shot       
5

## 2022-03-18 NOTE — BRIEF OPERATIVE NOTE - NSICDXBRIEFPROCEDURE_GEN_ALL_CORE_FT
Will call to schedule cardiology referral   no PROCEDURES:  Closure of enterotomy of small bowel 05-Feb-2020 21:22:41  PalDarvin  Open sigmoidectomy 05-Feb-2020 21:22:18  PalDarvin  Creation of end colostomy 05-Feb-2020 21:22:08  PalDarvin  Exploratory laparotomy 05-Feb-2020 21:21:49  PalDarvin  Cruzito's procedure 05-Feb-2020 21:21:39  PalDarvin

## 2022-05-16 NOTE — PATIENT PROFILE ADULT - ABILITY TO HEAR (WITH HEARING AID OR HEARING APPLIANCE IF NORMALLY USED):
Adequate: hears normal conversation without difficulty
Nasal mucosa clear.  Mouth with normal mucosa  Throat has no vesicles, no oropharyngeal exudates and uvula is midline.

## 2022-11-19 NOTE — PROGRESS NOTE ADULT - ASSESSMENT
50 years old POD#3 S/P Exploratory laparotomy: reversal of colostomy    Plan    - Advane diet to regular diet low residue  - Pain management as needed  - Daily dressing changes  - Encourage ambulation and incentive spirometry every hour.   - GI/DVT prophylaxis   - Keep abdominal binder in place  - Monitor BM  - Dispo home with VNS     50 years old POD#3 S/P Exploratory laparotomy: reversal of colostomy    Plan    - Advance diet to regular diet low residue  - Pain management as needed  - Daily dressing changes  - Encourage ambulation and incentive spirometry every hour.   - GI/DVT prophylaxis   - Keep abdominal binder in place  - Monitor BM  - Dispo home with VNS     50 years old POD#3 S/P Exploratory laparotomy: reversal of colostomy    Plan    - Advance diet to regular diet low residue  - Pain management as needed  - Daily dressing changes  - Encourage ambulation and incentive spirometry every hour.   - GI/DVT prophylaxis   - Keep abdominal binder in place  - Monitor BM  - Dispo home with VNS    Add:  Patient with hypomagnesemia, now resolved after supplementation LLOYD Lott made aware

## 2024-01-11 NOTE — DIETITIAN INITIAL EVALUATION ADULT. - +GENDER
Statement Selected Risks/benefits discussed with patient/surrogate/Vaccine Information Sheet (VIS) provided-VIS date: 8/6/21

## 2024-01-12 ENCOUNTER — APPOINTMENT (OUTPATIENT)
Dept: UROLOGY | Facility: CLINIC | Age: 54
End: 2024-01-12

## 2024-05-23 ENCOUNTER — APPOINTMENT (OUTPATIENT)
Dept: UROLOGY | Facility: CLINIC | Age: 54
End: 2024-05-23
Payer: COMMERCIAL

## 2024-05-23 VITALS
OXYGEN SATURATION: 97 % | HEART RATE: 75 BPM | HEIGHT: 68 IN | DIASTOLIC BLOOD PRESSURE: 74 MMHG | SYSTOLIC BLOOD PRESSURE: 119 MMHG | TEMPERATURE: 97.6 F | RESPIRATION RATE: 18 BRPM | WEIGHT: 180 LBS | BODY MASS INDEX: 27.28 KG/M2

## 2024-05-23 DIAGNOSIS — N40.1 BENIGN PROSTATIC HYPERPLASIA WITH LOWER URINARY TRACT SYMPMS: ICD-10-CM

## 2024-05-23 DIAGNOSIS — E78.00 PURE HYPERCHOLESTEROLEMIA, UNSPECIFIED: ICD-10-CM

## 2024-05-23 DIAGNOSIS — R35.0 FREQUENCY OF MICTURITION: ICD-10-CM

## 2024-05-23 DIAGNOSIS — Z83.3 FAMILY HISTORY OF DIABETES MELLITUS: ICD-10-CM

## 2024-05-23 DIAGNOSIS — N39.41 URGE INCONTINENCE: ICD-10-CM

## 2024-05-23 DIAGNOSIS — N13.8 BENIGN PROSTATIC HYPERPLASIA WITH LOWER URINARY TRACT SYMPMS: ICD-10-CM

## 2024-05-23 LAB
BILIRUB UR QL STRIP: NORMAL
COLLECTION METHOD: NORMAL
GLUCOSE UR-MCNC: NORMAL
HCG UR QL: 0.2 EU/DL
HGB UR QL STRIP.AUTO: NORMAL
KETONES UR-MCNC: NORMAL
LEUKOCYTE ESTERASE UR QL STRIP: NORMAL
NITRITE UR QL STRIP: NORMAL
PH UR STRIP: 5.5
PROT UR STRIP-MCNC: NORMAL
SP GR UR STRIP: 1.03

## 2024-05-23 PROCEDURE — 81003 URINALYSIS AUTO W/O SCOPE: CPT | Mod: QW

## 2024-05-23 PROCEDURE — 99203 OFFICE O/P NEW LOW 30 MIN: CPT

## 2024-05-23 RX ORDER — METRONIDAZOLE 500 MG/1
500 TABLET ORAL
Qty: 6 | Refills: 0 | Status: DISCONTINUED | COMMUNITY
Start: 2020-09-18 | End: 2024-05-23

## 2024-05-23 RX ORDER — AMOXICILLIN AND CLAVULANATE POTASSIUM 875; 125 MG/1; MG/1
875-125 TABLET, COATED ORAL
Qty: 20 | Refills: 0 | Status: DISCONTINUED | COMMUNITY
Start: 2020-11-09 | End: 2024-05-23

## 2024-05-23 RX ORDER — ATORVASTATIN CALCIUM 20 MG/1
20 TABLET, FILM COATED ORAL
Refills: 0 | Status: ACTIVE | COMMUNITY

## 2024-05-23 RX ORDER — NEOMYCIN SULFATE 500 MG/1
500 TABLET ORAL
Qty: 6 | Refills: 0 | Status: DISCONTINUED | COMMUNITY
Start: 2020-09-18 | End: 2024-05-23

## 2024-05-23 NOTE — PHYSICAL EXAM
[Normal Appearance] : normal appearance [Well Groomed] : well groomed [General Appearance - In No Acute Distress] : no acute distress [] : no respiratory distress [Respiration, Rhythm And Depth] : normal respiratory rhythm and effort [Exaggerated Use Of Accessory Muscles For Inspiration] : no accessory muscle use [Abdomen Soft] : soft [Abdomen Tenderness] : non-tender [Normal Station and Gait] : the gait and station were normal for the patient's age [No Focal Deficits] : no focal deficits [Oriented To Time, Place, And Person] : oriented to person, place, and time [Affect] : the affect was normal [Mood] : the mood was normal

## 2024-05-23 NOTE — HISTORY OF PRESENT ILLNESS
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-  goes to bed around 2am eats a yogurt and a small amount of water before 1 cup coffee/day 1 cup soda 1-2x per week social ETOH + urgency with occasional urge incontinence  variable stream    with 2 children works the evening shift as a  reports his brother "has the same problem"   h/o diverticulitis- s/p perforation of sigmoid colon with colostomy and subsequent reversal 2020  Udip- 5/23/24   -neg PSA- 10/2023-  0.3 BUN/Creat-22/1.0 [Urinary Incontinence] : urinary incontinence [Urinary Urgency] : urinary urgency [Urinary Frequency] : urinary frequency [Nocturia] : nocturia [Weak Stream] : no weak stream [Hematuria - Gross] : no gross hematuria

## 2024-05-23 NOTE — ASSESSMENT
[FreeTextEntry1] : 1. Urinary frquency- ? etiology--lifestyle?? 2.urgency and occasional urge incontinence  Plan: -renal/bladder us with PVR -rto 4 weeks -consider urodynamic testing

## 2024-05-23 NOTE — END OF VISIT
Jena with [facility]  called wanting [place] order for speech therapy for pt pt daughter Jaki wanted to know if home health can draw pt blood for next appointment?       [4. Prevent psychological harm to patient or others] : Reason - Prevent psychological harm to patient or others [FreeTextEntry3] : PA/RN  present due to pt gender / age/  physicality  / acted as scribe for this encounter.  I have reviewed and agreed with the written statements and information.  I have edited and changed and highlighted this information when clinically appropriate.

## 2024-06-24 NOTE — PATIENT PROFILE ADULT - NSPROPASSIVESMOKEEXPOSURE_GEN_A_NUR
Show Applicator Variable?: Yes Post-Care Instructions: I reviewed with the patient in detail post-care instructions. Patient is to wear sunprotection, and avoid picking at any of the treated lesions. Pt may apply Vaseline to crusted or scabbing areas. Duration Of Freeze Thaw-Cycle (Seconds): 0 Render Post-Care Instructions In Note?: no Detail Level: Simple Consent: The patient's consent was obtained including but not limited to risks of crusting, scabbing, blistering, scarring, darker or lighter pigmentary change, recurrence, incomplete removal and infection. Number Of Freeze-Thaw Cycles: 1 freeze-thaw cycle No

## 2024-07-18 ENCOUNTER — APPOINTMENT (OUTPATIENT)
Dept: UROLOGY | Facility: CLINIC | Age: 54
End: 2024-07-18

## 2025-04-07 ENCOUNTER — EMERGENCY (EMERGENCY)
Facility: HOSPITAL | Age: 55
LOS: 0 days | Discharge: HOME | End: 2025-04-07
Attending: EMERGENCY MEDICINE
Payer: COMMERCIAL

## 2025-04-07 VITALS
OXYGEN SATURATION: 98 % | HEART RATE: 68 BPM | TEMPERATURE: 98 F | SYSTOLIC BLOOD PRESSURE: 109 MMHG | DIASTOLIC BLOOD PRESSURE: 72 MMHG | RESPIRATION RATE: 20 BRPM

## 2025-04-07 VITALS
RESPIRATION RATE: 20 BRPM | DIASTOLIC BLOOD PRESSURE: 84 MMHG | OXYGEN SATURATION: 96 % | TEMPERATURE: 100 F | HEART RATE: 103 BPM | SYSTOLIC BLOOD PRESSURE: 131 MMHG

## 2025-04-07 DIAGNOSIS — R53.1 WEAKNESS: ICD-10-CM

## 2025-04-07 DIAGNOSIS — R50.9 FEVER, UNSPECIFIED: ICD-10-CM

## 2025-04-07 DIAGNOSIS — R53.83 OTHER FATIGUE: ICD-10-CM

## 2025-04-07 DIAGNOSIS — R05.9 COUGH, UNSPECIFIED: ICD-10-CM

## 2025-04-07 DIAGNOSIS — Z90.49 ACQUIRED ABSENCE OF OTHER SPECIFIED PARTS OF DIGESTIVE TRACT: Chronic | ICD-10-CM

## 2025-04-07 DIAGNOSIS — R05.1 ACUTE COUGH: ICD-10-CM

## 2025-04-07 DIAGNOSIS — Z87.19 PERSONAL HISTORY OF OTHER DISEASES OF THE DIGESTIVE SYSTEM: ICD-10-CM

## 2025-04-07 LAB
ALBUMIN SERPL ELPH-MCNC: 4.1 G/DL — SIGNIFICANT CHANGE UP (ref 3.5–5.2)
ALP SERPL-CCNC: 79 U/L — SIGNIFICANT CHANGE UP (ref 30–115)
ALT FLD-CCNC: 21 U/L — SIGNIFICANT CHANGE UP (ref 0–41)
ANION GAP SERPL CALC-SCNC: 12 MMOL/L — SIGNIFICANT CHANGE UP (ref 7–14)
APPEARANCE UR: CLEAR — SIGNIFICANT CHANGE UP
AST SERPL-CCNC: 22 U/L — SIGNIFICANT CHANGE UP (ref 0–41)
BACTERIA # UR AUTO: ABNORMAL /HPF
BASOPHILS # BLD AUTO: 0.05 K/UL — SIGNIFICANT CHANGE UP (ref 0–0.2)
BASOPHILS NFR BLD AUTO: 0.3 % — SIGNIFICANT CHANGE UP (ref 0–1)
BILIRUB SERPL-MCNC: 0.3 MG/DL — SIGNIFICANT CHANGE UP (ref 0.2–1.2)
BILIRUB UR-MCNC: NEGATIVE — SIGNIFICANT CHANGE UP
BUN SERPL-MCNC: 11 MG/DL — SIGNIFICANT CHANGE UP (ref 10–20)
CALCIUM SERPL-MCNC: 9.1 MG/DL — SIGNIFICANT CHANGE UP (ref 8.4–10.5)
CHLORIDE SERPL-SCNC: 97 MMOL/L — LOW (ref 98–110)
CO2 SERPL-SCNC: 22 MMOL/L — SIGNIFICANT CHANGE UP (ref 17–32)
COLOR SPEC: YELLOW — SIGNIFICANT CHANGE UP
CREAT SERPL-MCNC: 1 MG/DL — SIGNIFICANT CHANGE UP (ref 0.7–1.5)
DIFF PNL FLD: NEGATIVE — SIGNIFICANT CHANGE UP
EGFR: 89 ML/MIN/1.73M2 — SIGNIFICANT CHANGE UP
EGFR: 89 ML/MIN/1.73M2 — SIGNIFICANT CHANGE UP
EOSINOPHIL # BLD AUTO: 0 K/UL — SIGNIFICANT CHANGE UP (ref 0–0.7)
EOSINOPHIL NFR BLD AUTO: 0 % — SIGNIFICANT CHANGE UP (ref 0–8)
EPI CELLS # UR: PRESENT
FLUAV AG NPH QL: SIGNIFICANT CHANGE UP
FLUBV AG NPH QL: SIGNIFICANT CHANGE UP
GLUCOSE SERPL-MCNC: 125 MG/DL — HIGH (ref 70–99)
GLUCOSE UR QL: NEGATIVE MG/DL — SIGNIFICANT CHANGE UP
HCT VFR BLD CALC: 40.6 % — LOW (ref 42–52)
HGB BLD-MCNC: 12.8 G/DL — LOW (ref 14–18)
IMM GRANULOCYTES NFR BLD AUTO: 0.5 % — HIGH (ref 0.1–0.3)
KETONES UR-MCNC: NEGATIVE MG/DL — SIGNIFICANT CHANGE UP
LEUKOCYTE ESTERASE UR-ACNC: NEGATIVE — SIGNIFICANT CHANGE UP
LYMPHOCYTES # BLD AUTO: 1 K/UL — LOW (ref 1.2–3.4)
LYMPHOCYTES # BLD AUTO: 5.6 % — LOW (ref 20.5–51.1)
MAGNESIUM SERPL-MCNC: 1.8 MG/DL — SIGNIFICANT CHANGE UP (ref 1.8–2.4)
MCHC RBC-ENTMCNC: 23.7 PG — LOW (ref 27–31)
MCHC RBC-ENTMCNC: 31.5 G/DL — LOW (ref 32–37)
MCV RBC AUTO: 75 FL — LOW (ref 80–94)
MONOCYTES # BLD AUTO: 0.83 K/UL — HIGH (ref 0.1–0.6)
MONOCYTES NFR BLD AUTO: 4.7 % — SIGNIFICANT CHANGE UP (ref 1.7–9.3)
NEUTROPHILS # BLD AUTO: 15.78 K/UL — HIGH (ref 1.4–6.5)
NEUTROPHILS NFR BLD AUTO: 88.9 % — HIGH (ref 42.2–75.2)
NITRITE UR-MCNC: NEGATIVE — SIGNIFICANT CHANGE UP
NRBC BLD AUTO-RTO: 0 /100 WBCS — SIGNIFICANT CHANGE UP (ref 0–0)
PH UR: 6.5 — SIGNIFICANT CHANGE UP (ref 5–8)
PLATELET # BLD AUTO: 167 K/UL — SIGNIFICANT CHANGE UP (ref 130–400)
PMV BLD: 10.4 FL — SIGNIFICANT CHANGE UP (ref 7.4–10.4)
POTASSIUM SERPL-MCNC: 4.5 MMOL/L — SIGNIFICANT CHANGE UP (ref 3.5–5)
POTASSIUM SERPL-SCNC: 4.5 MMOL/L — SIGNIFICANT CHANGE UP (ref 3.5–5)
PROT SERPL-MCNC: 6.9 G/DL — SIGNIFICANT CHANGE UP (ref 6–8)
PROT UR-MCNC: 30 MG/DL
RBC # BLD: 5.41 M/UL — SIGNIFICANT CHANGE UP (ref 4.7–6.1)
RBC # FLD: 14.7 % — HIGH (ref 11.5–14.5)
RBC CASTS # UR COMP ASSIST: 0 /HPF — SIGNIFICANT CHANGE UP (ref 0–4)
RSV RNA NPH QL NAA+NON-PROBE: SIGNIFICANT CHANGE UP
SARS-COV-2 RNA SPEC QL NAA+PROBE: SIGNIFICANT CHANGE UP
SODIUM SERPL-SCNC: 131 MMOL/L — LOW (ref 135–146)
SOURCE RESPIRATORY: SIGNIFICANT CHANGE UP
SP GR SPEC: 1.01 — SIGNIFICANT CHANGE UP (ref 1–1.03)
SQUAMOUS # UR AUTO: 2 /HPF — SIGNIFICANT CHANGE UP (ref 0–5)
UROBILINOGEN FLD QL: 0.2 MG/DL — SIGNIFICANT CHANGE UP (ref 0.2–1)
WBC # BLD: 17.74 K/UL — HIGH (ref 4.8–10.8)
WBC # FLD AUTO: 17.74 K/UL — HIGH (ref 4.8–10.8)
WBC UR QL: 4 /HPF — SIGNIFICANT CHANGE UP (ref 0–5)

## 2025-04-07 PROCEDURE — 87086 URINE CULTURE/COLONY COUNT: CPT

## 2025-04-07 PROCEDURE — 85025 COMPLETE CBC W/AUTO DIFF WBC: CPT

## 2025-04-07 PROCEDURE — 99285 EMERGENCY DEPT VISIT HI MDM: CPT

## 2025-04-07 PROCEDURE — 81001 URINALYSIS AUTO W/SCOPE: CPT

## 2025-04-07 PROCEDURE — 99284 EMERGENCY DEPT VISIT MOD MDM: CPT | Mod: 25

## 2025-04-07 PROCEDURE — 96374 THER/PROPH/DIAG INJ IV PUSH: CPT

## 2025-04-07 PROCEDURE — 71045 X-RAY EXAM CHEST 1 VIEW: CPT

## 2025-04-07 PROCEDURE — 0241U: CPT

## 2025-04-07 PROCEDURE — 83735 ASSAY OF MAGNESIUM: CPT

## 2025-04-07 PROCEDURE — 71045 X-RAY EXAM CHEST 1 VIEW: CPT | Mod: 26

## 2025-04-07 PROCEDURE — 36415 COLL VENOUS BLD VENIPUNCTURE: CPT

## 2025-04-07 PROCEDURE — 80053 COMPREHEN METABOLIC PANEL: CPT

## 2025-04-07 RX ORDER — DOXYCYCLINE HYCLATE 100 MG
1 TABLET ORAL
Qty: 14 | Refills: 0
Start: 2025-04-07 | End: 2025-04-13

## 2025-04-07 RX ORDER — SODIUM CHLORIDE 9 G/1000ML
1000 INJECTION, SOLUTION INTRAVENOUS ONCE
Refills: 0 | Status: COMPLETED | OUTPATIENT
Start: 2025-04-07 | End: 2025-04-07

## 2025-04-07 RX ORDER — ACETAMINOPHEN 500 MG/5ML
975 LIQUID (ML) ORAL ONCE
Refills: 0 | Status: COMPLETED | OUTPATIENT
Start: 2025-04-07 | End: 2025-04-07

## 2025-04-07 RX ORDER — KETOROLAC TROMETHAMINE 30 MG/ML
15 INJECTION, SOLUTION INTRAMUSCULAR; INTRAVENOUS ONCE
Refills: 0 | Status: DISCONTINUED | OUTPATIENT
Start: 2025-04-07 | End: 2025-04-07

## 2025-04-07 RX ADMIN — KETOROLAC TROMETHAMINE 15 MILLIGRAM(S): 30 INJECTION, SOLUTION INTRAMUSCULAR; INTRAVENOUS at 10:11

## 2025-04-07 RX ADMIN — SODIUM CHLORIDE 1000 MILLILITER(S): 9 INJECTION, SOLUTION INTRAVENOUS at 10:55

## 2025-04-07 RX ADMIN — SODIUM CHLORIDE 1000 MILLILITER(S): 9 INJECTION, SOLUTION INTRAVENOUS at 10:12

## 2025-04-07 RX ADMIN — Medication 975 MILLIGRAM(S): at 10:54

## 2025-04-07 NOTE — ED PROVIDER NOTE - PROGRESS NOTE DETAILS
Pt reports improvement in symptoms. Discussed results and provided copy to pt. Discussed likelihood that patients symptoms are 2/2 viral illness but given negative flu/covid will sent with Doxycycline. Offered admission but pt prefers to go home and states will return if his symptoms worsen/persist. Pt understands to follow-up with PMD.

## 2025-04-07 NOTE — ED PROVIDER NOTE - OBJECTIVE STATEMENT
54-year-old male with past medical history of perforated diverticulitis s/p partial colon resection presents to the ED complaining of fever, chills, body aches and generalized weakness x 2 days.  Patient has been taking OTC meds with mild improvement in symptoms but presented today because symptoms are not resolving.  He denies any known sick contacts.  He denies other complaints. Pt denies nausea, vomiting, abdominal pain, diarrhea, headache, dizziness, chest pain, SOB, back pain, LOC, trauma, urinary symptoms, cough, calf pain/swelling, recent travel, recent surgery.

## 2025-04-07 NOTE — ED PROVIDER NOTE - PHYSICAL EXAMINATION
VITAL SIGNS: I have reviewed nursing notes and confirm.  CONSTITUTIONAL: 53 yo F laying on stretcher; in no acute distress.  SKIN: Skin exam is warm and dry, no acute rash.  HEAD: Normocephalic; atraumatic.  EYES: PERRL, EOM intact; conjunctiva and sclera clear.  ENT: No nasal discharge; airway clear.   NECK: Supple; non tender. No meningeal signs.   CARD: S1, S2 normal; no murmurs, gallops, or rubs. Regular rate and rhythm.  RESP: No wheezes, rales or rhonchi. Speaking in full sentences.   ABD: Normal bowel sounds; soft; non-distended; non-tender; No rebound or guarding. No CVA tenderness.  EXT: Normal ROM. No clubbing, cyanosis or edema.  NEURO: Alert, oriented. Grossly unremarkable. No focal deficits.

## 2025-04-07 NOTE — ED PROVIDER NOTE - PATIENT PORTAL LINK FT
You can access the FollowMyHealth Patient Portal offered by [facility] by registering at the following website: [email or web address]. By joining [facility]’s FollowMyHealth portal, you will also be able to view your health information using other applications (apps) compatible with our system.

## 2025-04-07 NOTE — ED PROVIDER NOTE - CLINICAL SUMMARY MEDICAL DECISION MAKING FREE TEXT BOX
Patient is a 54-year-old male who presents to the emergency department complaining of cough cold congestion onset over the past 48 hours patient's taking p.o. medicine without improvement denies any foreign travel denies any recent antibiotics patient denies any headache neck pain chest pain shortness of breath abdominal pain back pain dysuria hesitancy or frequency    On physical exam patient is normocephalic atraumatic pupils equal round react light accommodation extraocular muscles intact oropharynx clear chest clear to auscultation bilaterally abdomen soft nontender nondistended bowel sounds positive no guarding or rebound extremities full range of motion pedal pulses 2+ radial pulse 2+ skin no rashes noted    Assessment plan patient presents for evaluation of increasing fatigue body aches fevers and chills we obtained labs which reveal evidence of elevated white blood cell count urinalysis negative RVP negative we obtained chest x-ray per my independent evaluation not consistent with pneumonia or pneumothorax patient given IV fluids IV Tylenol IV pain medicine he improved here in the emergency department is tolerating p.o. patient's symptoms currently are not consistent with central infection and as patient has full range of motion of the neck with a normal neurologic exam no skin changes in addition patient does have an elevated white blood cell count however he has no abdominal pain no guarding no rebound and is now tolerating p.o. not consistent with colitis or appendicitis we discussed indications to return advised to return to the emergency department in the next 12 to 24 hours if he has persistent or recurrent symptoms overall advised have low threshold for return to the emergency department patient is aware

## 2025-04-07 NOTE — ED PROVIDER NOTE - ATTENDING APP SHARED VISIT CONTRIBUTION OF CARE
I have personally performed a history and physical exam on this patient and personally directed the management of the patient. Patient is a 54-year-old male who presents to the emergency department complaining of cough cold congestion onset over the past 48 hours patient's taking p.o. medicine without improvement denies any foreign travel denies any recent antibiotics patient denies any headache neck pain chest pain shortness of breath abdominal pain back pain dysuria hesitancy or frequency    On physical exam patient is normocephalic atraumatic pupils equal round react light accommodation extraocular muscles intact oropharynx clear chest clear to auscultation bilaterally abdomen soft nontender nondistended bowel sounds positive no guarding or rebound extremities full range of motion pedal pulses 2+ radial pulse 2+ skin no rashes noted    Assessment plan patient presents for evaluation of increasing fatigue body aches fevers and chills we obtained labs which reveal evidence of elevated white blood cell count urinalysis negative RVP negative we obtained chest x-ray per my independent evaluation not consistent with pneumonia or pneumothorax patient given IV fluids IV Tylenol IV pain medicine he improved here in the emergency department is tolerating p.o. patient's symptoms currently are not consistent with central infection and as patient has full range of motion of the neck with a normal neurologic exam no skin changes in addition patient does have an elevated white blood cell count however he has no abdominal pain no guarding no rebound and is now tolerating p.o. not consistent with colitis or appendicitis we discussed indications to return advised to return to the emergency department in the next 12 to 24 hours if he has persistent or recurrent symptoms overall advised have low threshold for return to the emergency department patient is aware

## 2025-04-08 LAB
CULTURE RESULTS: SIGNIFICANT CHANGE UP
SPECIMEN SOURCE: SIGNIFICANT CHANGE UP